# Patient Record
Sex: FEMALE | Race: WHITE
[De-identification: names, ages, dates, MRNs, and addresses within clinical notes are randomized per-mention and may not be internally consistent; named-entity substitution may affect disease eponyms.]

---

## 2017-05-17 NOTE — RADRPT
PROCEDURE:   XR Chest 1 View. 

 

CLINICAL INDICATION: Shortness of breath.

 

TECHNIQUE:   AP view of the chest was obtained. 

 

COMPARISON:   None. 

 

FINDINGS:

The cardiomediastinal silhouette is within normal limits. Elevation of the right hemidiaphragm is id
entified.  Diffuse mild interstitial prominence is seen in both lungs.  Atelectasis is noted at the 
lung bases. No consolidations are identified.  No pneumothorax is seen.  Osseous structures are inta
ct. 

 

IMPRESSION:

Mild elevation of the right hemidiaphragm.

 

Hypoinflated lungs with atelectasis at the lung bases.

 

Diffuse mild interstitial prominence in both lungs.  Interstitial prominence could be chronic. 

 

 

RPTAT: AA

_____________________________________________ 

.Florentino Watson MD, MD           Date    Time 

Electronically viewed and signed by .Florentino Watson MD, MD on 05/17/2017 18:00 

 

D:  05/17/2017 18:00  T:  05/17/2017 18:00

.P/

## 2017-05-17 NOTE — RADRPT
PROCEDURE:   CT Abdomen and Pelvis without contrast 

 

CLINICAL INDICATION:  Right flank pain

 

TECHNIQUE:   Transaxial images were obtained through the abdomen and pelvis on a multi-slice scanner
 without the intravenous contrast administration.  No oral contrast had previously been given. Sagit
kristen and coronal re-formations were subsequently reconstructed.

 

One or more of the following dose reduction techniques were used:

- Automated exposure control.

- Adjustment of the mA and/or kV according to patient size.

- Use of iterative reconstruction technique.

 

Radiation dose: CTDIvol = 7.11 mGy; DLP = 401.36 mGy-cm.

 

COMPARISON:   No prior studies are available for comparison. 

 

FINDINGS:

 

Lung bases: Discoid atelectatic changes seen at both lung bases.

 

Liver: The liver is borderline enlarged but no focal lesion is identified.

 

Gallbladder: The gallbladder is distended.  A small stone is seen a gravity 8 within the gallbladder
.  The gallbladder wall appears mildly thickened.

 

Bile ducts: The common bile duct is dilated to 1.1 cm through the head of the pancreas.  No choledoc
holith is identified 

 

Pancreas: Appears normal with no mass or inflammation evident.

 

Spleen: Normal in size with no focal lesion.

 

Adrenals: Normal with no mass identified.

 

Kidneys, ureters and bladder: The right kidney is quite small without hydronephrosis or intra renal 
calcification.  Two 2 mm nonobstructing nephroliths are seen in the superior pole right kidney and t
he kidney is lobulated in contour compatible with cortical scarring but there is no hydronephrosis. 
The ureters are normal in caliber and no ureteroliths are identified. The bladder is suboptimally di
stended giving the wall a mildly thickened appearance.

 

Reproductive organs: The uterus is absent.  No adnexal mass is evident.

 

Stomach and bowel: There is a small hiatal hernia.  Surgical clips are seen in the region of the sto
mach.  There is no evidence of small bowel obstruction.  Substantial stool is seen within the colon.
  There is dilatation of the cecum and ascending colon as well as the proximal transverse colon with
 thickening of the bowel wall along with intramural air identified.

 

Appendix: The vermiform appendix is not identified.

 

Peritoneum: There is considerable stranding within the omentum in the region of the hepatic flexure 
of the colon and gallbladder.  No free intraperitoneal fluid or air is identified.

 

Aorta: There is atherosclerotic vascular calcification but no abdominal aortic aneurysm is evident.

 

IVC: Unremarkable.

 

Lymph nodes: No pathologically enlarged nodes are identified.

 

Osseous structures: The osseous elements appear intact.  A 5 mm central posterior disk protrusion is
 seen at L5-S1.

 

IMPRESSION: 

1.  There is stool distension of the cecum, ascending and proximal transverse colon with bowel wall 
thickening at the hepatic flexure and proximal transverse colon with intramural air.  Differential p
ossibilities include ischemic bowel as well as a benign etiologies.  No portal air is identified. Cl
inical correlation is indicated.  There is no evidence of bowel obstruction. There is a hiatal herni
a and evidence of previous gastric surgery.

2. The gallbladder is distended, there is cholelithiasis in the gallbladder wall appears thickened r
aising the possibility of cholecystitis.

3.  The common bile duct is dilated to 1.1 cm through the head of the pancreas.  No choledocholith i
s evident and no pancreatic pathology is noted.

4.  Extensive stranding seen to the omentum in the region of the hepatic flexure and gallbladder com
patible with edema and inflammatory change.

5.  There is no free intraperitoneal fluid or air.

6.  Atrophic right kidney without hydronephrosis.  The left kidney is normal in size with cortical s
carring and with two 2 mm nonobstructing nephroliths within the superior pole.  There is no hydronep
hrosis.

7.  Borderline hepatomegaly

8.  5 mm central posterior disk protrusion at L5-S1.

 

Findings of right colonic bowel wall thickening with intramural air for which ischemia cannot be exc
luded along with possible cholecystitis were telephoned by Delbert Concepcion MD to Dr. Spence on 05/1
7/2017 at 1550 hours.

_____________________________________________ 

Physician Adrian           Date    Time 

Electronically viewed and signed by Physician Adrian on 05/17/2017 16:01 

 

D:  05/17/2017 16:01  T:  05/17/2017 16:01

/

## 2017-05-17 NOTE — HP
Date/Time of Note


Date/Time of Note


DATE: 5/17/17 


TIME: 18:21





Assessment/Plan


VTE Prophylaxis


VTE Prophylaxis Intervention:  SCD's





Assessment/Plan


Assessment/Plan


62-year-old female who presents to the ER with right-sided abdominal pain, 

managed as follows


1.  Cholelithiasis with choledocholithiasis


2.  Acute renal failure rule out chronic kidney disease


3.  Leukocytosis secondary to #1 rule out acute cholecystitis


4.  Tobacco user





Plan:





1   Admit to [facility], MRCP is not indicated at this time as we have CT 

confirmation of choledocholithiasis, I believe patient should proceed with 

ERCP.  We will get GI consultation


2.  General surgical consultation has been obtained by ER


3.  Empiric antibiotics for cholecystitis


4.  Smoking Cessation Therapy:  Pt. was lectured for greater than  3 minutes on 

the health risks of continued smoking and the benefits of cessation and this 

will continue to be reinforced throughout hospitalization.


5.  Supportive care and pain control.





Plan of care has been discussed with patient questions answered.  Further 

interventions per clinical course.





Prophylaxis: SCDs and PPI





HPI/ROS


Admit Date/Time


Admit Date/Time


 May 17, 2017





Hx of Present Illness


PRESENTING COMPLAINT: abd pain


HISTORY OF PRESENTING COMPLAINT: This is a pleasant 62-year-old female who 

presents to the ER today with abdominal pain.  Pain is located in the 

epigastric region that has been going on for the last 2 days.  Pain is 

associated with nausea and vomiting, and described as an aching and sharp pain.

  Patient does have a history of gallstones and she might have a history of 

renal problems.  Pain was relieved by medications given in the emergency room.  

Patient was worked up extensively by ER doctor, and findings are highly 

suggestive of cholelithiasis with choledocholithiasis.  She is being admitted 

for further workup and management as well as gastroenterology as well as 

surgical review.





ROS


12 point review of system was done and pertinent findings as noted





ROS: 


CONSTITUTIONAL: denies fever, chills, weight loss, weight gain


HEENT:  denies headaches, any vertigo, any sore throat or rhinorrhea. 


Eyes:   No double or blurred vision or eye pain. 


CARDIOVASCULAR:  no chest discomfort, chest pain, irregular rhythm, tachycardia 

or diaphoresis. 


RESPIRATORY: denies cough or shortness of breath or wheezing. 


GENITOURINARY:  denies dysuria, frequency, urgency or hematuria. 


MUSCULOSKELETAL:   also denies myalgias, arthralgias or edema. 


SKIN:  denies rash or jaundice


NEUROLOGIC:  denies weakness, dizziness, focal neurological change or headache.


 PSYCHIATRIC: denies  history of depression in the past,  any suicidal 

ideation. substance abuse. 


ENDOCRINE:  denies polyuria, polydipsia or hot or cold intolerance. 


HEMATOLOGIC:  denies history of easy bruising, anemia or eczema.





PMH/Family/Social


Past Medical History


1.  Gallstones


2.  Chronic kidney disease likely secondary to kidney stones





Past Surgical History


1.  Appendectomy


2.  Hysterectomy





Social History


Alcohol Use:  none


Smoking Status:  Current every day smoker


Drug Use:  none





Exam/Review of Systems


Vital Signs


Vitals





 VS - Last 72 Hours, by Label








  Date Time  Temp Pulse Resp B/P Pulse Ox O2 Delivery O2 Flow Rate FiO2


 


5/17/17 13:02 97.5 86 20 112/56 100   








 Vital Signs








  Date Time  Temp Pulse Resp B/P Pulse Ox O2 Delivery O2 Flow Rate FiO2


 


5/17/17 13:02 97.5 86 20 112/56 100   











Exam


Constitutional:  alert, oriented, 


   No distress


Psych:  anxiety


Head:  normocephalic


Eyes:  nl conjunctiva, 


   No icteric


ENMT:  mucosa pink and moist


Neck:  non-tender, supple


Respiratory:  clear to auscultation, normal air movement


Cardiovascular:  regular rate and rhythm, 


   No murmurs/extra sounds


Gastrointestinal:  bowel sounds, soft, tender (RUQ and epigastrium)


Neurological:  lethargic, nl mental status


Skin:  No rash or lesions





Labs


Result Diagram:  


5/17/17 1327                                                                   

             5/17/17 1327








Medications


Medications





 Current Medications


Metronidazole 100 ml @  100 mls/hr ONCE  ONCE IVPB ;  Start 5/17/17 at 18:30;  

Stop 5/17/17 at 19:29


Sodium Chloride (NS) 1,000 ml @  80 mls/hr [id number] IV ;  Start 5/17/17 at 18:04

;  Stop 5/18/17 at 06:33





Procedures


Procedures





 Laboratory Tests








Test


  5/17/17


13:27 5/17/17


15:00


 


White Blood Count 20.910^3/ul  


 


Red Blood Count 3.7610^6/ul  


 


Hemoglobin 10.7g/dl  


 


Hematocrit 33.8%  


 


Mean Corpuscular Volume 89.9fl  


 


Mean Corpuscular Hemoglobin 28.5pg  


 


Mean Corpuscular Hemoglobin


Concent 31.7g/dl 


  


 


 


Red Cell Distribution Width 14.5%  


 


Platelet Count 89020^3/UL  


 


Mean Platelet Volume 10.8fl  


 


Neutrophils % 84.7%  


 


Lymphocytes % 5.0%  


 


Monocytes % 3.1%  


 


Eosinophils % 0.0%  


 


Basophils % 0.2%  


 


Nucleated Red Blood Cells % 0.0/100WBC  


 


Neutrophils # 17.610^3/ul  


 


Lymphocytes # 1.110^3/ul  


 


Monocytes # 0.710^3/ul  


 


Eosinophils # 0.010^3/ul  


 


Basophils # 0.010^3/ul  


 


Nucleated Red Blood Cells # 0.010^3/ul  


 


Sodium Level 133mmol/L  


 


Potassium Level 5.1mmol/L  


 


Chloride Level 104mmol/L  


 


Carbon Dioxide Level 16mmol/L  


 


Anion Gap 18  


 


Blood Urea Nitrogen 63mg/dl  


 


Creatinine 2.73mg/dl  


 


Glucose Level 91mg/dl  


 


Calcium Level 8.7mg/dl  


 


Total Bilirubin 0.0mg/dl  


 


Direct Bilirubin 0.00mg/dl  


 


Indirect Bilirubin 0.0mg/dl  


 


Aspartate Amino Transf


(AST/SGOT) 42IU/L 


  


 


 


Alanine Aminotransferase


(ALT/SGPT) 45IU/L 


  


 


 


Alkaline Phosphatase 215IU/L  


 


Total Protein 7.4g/dl  


 


Albumin 3.8g/dl  


 


Globulin 3.60g/dl  


 


Albumin/Globulin Ratio 1.05  


 


Lipase 78U/L  


 


Urine Color  YELLOW 


 


Urine Clarity  CLEAR 


 


Urine pH  5.5 


 


Urine Specific Gravity  1.020 


 


Urine Ketones  NEGATIVE 


 


Urine Nitrite  NEGATIVE 


 


Urine Bilirubin  NEGATIVE 


 


Urine Urobilinogen  0.2  E.U./dL 


 


Urine Leukocyte Esterase  NEGATIVE 


 


Urine Microscopic RBC  NONE SEEN/HPF 


 


Urine Microscopic WBC  0-2/HPF 


 


Urine Amorphous Urates  MODERATE 


 


Urine Hemoglobin  NEGATIVE 


 


Urine Glucose  NEGATIVE% 


 


Urine Total Protein  1+ 








 Current Medications








 Medications


  (Trade)  Dose


 Ordered  Sig/Hallie


 Route


 PRN Reason  Start Time


 Stop Time Status Last Admin


Dose Admin


 


 Sodium Chloride


  (NS)  1,000 ml @ 


 1,000 mls/hr  Q1H STAT


 IV


   5/17/17 13:23


 5/17/17 14:22 DC 5/17/17 13:31


1,000 MLS/HR


 


 Ondansetron HCl


  (Zofran Inj)  4 mg  ONCE  STAT


 IV


   5/17/17 13:23


 5/17/17 13:24 DC 5/17/17 13:31


4 MG


 


 Famotidine


  (Pepcid Iv)  20 mg  ONCE  STAT


 IV


   5/17/17 13:23


 5/17/17 13:24 DC 5/17/17 13:31


20 MG


 


 Ketorolac


 Tromethamine 30 mg  30 mg  ONCE  STAT


 IV


   5/17/17 13:23


 5/17/17 13:24 DC 5/17/17 13:31


30 MG


 


 Ceftriaxone Sodium


  (Rocephin)  50 ml @ 


 100 mls/hr  ONCE  ONCE


 IVPB


   5/17/17 16:00


 5/17/17 16:29 DC 5/17/17 15:45


100 MLS/HR


 


 Morphine Sulfate


  (morphine)  6 mg  ONCE  ONCE


 IV


   5/17/17 16:30


 5/17/17 16:31 DC 5/17/17 16:08


6 MG


 


 IV Flush 10 ml  10 ml  STK-MED ONCE


 .ROUTE


   5/17/17 16:06


 5/17/17 16:07 DC 5/17/17 16:32


10 ML


 


 Sodium Chloride


  (NS)  100 ml @ ud  STK-MED ONCE


 .ROUTE


   5/17/17 16:06


 5/17/17 16:07 DC 5/17/17 16:33


50 ML/14 S


 


 Iodixanol


  (Visipaque Locm)  100 ml  STK-MED ONCE


 .ROUTE


   5/17/17 16:06


 5/17/17 16:07 DC 5/17/17 16:34


100 ML


 


 Iodixanol


  (Visipaque Locm)  50 ml  STK-MED ONCE


 .ROUTE


   5/17/17 16:06


 5/17/17 16:07 DC  


 


 


 Sodium Chloride


 1760 ml  1,760 ml  BOLUS OVER 2 HOURS STAT


 IV*


   5/17/17 17:08


 5/17/17 17:10 DC  


 


 


 Metronidazole  100 ml @ 


 100 mls/hr  ONCE  ONCE


 IVPB


   5/17/17 18:30


 5/17/17 19:29   


 


 


 Sodium Chloride


  (NS)  1,000 ml @ 


 80 mls/hr  [id number]


 IV


   5/17/17 18:04


 5/18/17 06:33   


 


 


 Ondansetron HCl


  (Zofran Inj)  4 mg  BRIDGE ORDER PRN


 IV


 NAUSEA AND/OR VOMITING  5/17/17 18:30


 5/18/17 18:29   


 


 


 Acetaminophen


  (Tylenol Tab)  650 mg  ER BRIDGE PRN


 PO


 MILD PAIN/FEVER  5/17/17 18:30


 5/18/17 18:29   


 








PROCEDURE:   US Abdomen (right upper quadrant). 


 


CLINICAL INDICATION:   Right upper quadrant abdomen pain.  


 


TECHNIQUE:   Multiple real-time longitudinal and transverse images of the right 

upper quadrant of the abdomen were acquired utilizing a curved array 

transducer. Images were reviewed on a high-resolution PACS workstation. 


 


COMPARISON:   CT scan of the abdomen and pelvis done earlier the same day. 


 


FINDINGS:


The liver is normal in size and normal in echogenicity.


There is no focal hepatic lesion.   Color Doppler and pulsed Doppler sonography 

demonstrate normal antegrade flow in the portal vein. 


The gallbladder is distended and there is mild thickening of the wall measuring 

4 mm.  There is no surrounding fluid.  No gallstones are visualized.


The bile ducts are dilated with the common bile duct measuring 14.5 mm in 

diameter.  


The pancreas is not visualized due to overlying bowel gas.  


No free fluid is present.  


The right kidney is not visualized.


 


IMPRESSION:


1.  Distended gallbladder with mild wall thickening.  No gallstones.  This may 

indicate acalculous cholecystitis.  Clinical correlation is advised.


2.  Pancreas not visualized.


3.  Dilated common bile duct measuring 14.5 mm.  Correlation with MRCP should 

be considered.


4.  Right kidney not visualized. 


 


RPTAT: QQ


_____________________________________________ 


.Alvarado Fajardo MD, MD           Date    Time 


Electronically viewed and signed by .Alvarado Fajardo MD, MD on 05/17/2017 17:49 


 


D:  05/17/2017 17:49  T:  05/17/2017 17:49


.R/





CC: KELLIE IBRAHIM DO


PROCEDURE:   CT angiogram of the abdomen and pelvis with bilateral lower 

extremity runoff and with 3-D reconstructions


 


CLINICAL INDICATION:   r/o ischemic colitis


 


TECHNIQUE:   CT angiogram of the  abdomen and pelvis was performed on a 

multislice CT scanner .  The patient was scanned after administration of 

intravenous contrast.  Sagittal and coronal reformatted images were obtained 

from the axial source images. 3D MIP reformatted images were also created from 

the axial source images.


 


DLP            785.95    mGycm 


CTDI vol     7.62, 7.66    mGy


 


COMPARISON:   CT abdomen/pelvis from 05/17/2017 at 15:22 hours


 


FINDINGS:


 


ANGIOGRAM FINDINGS:


 


There is no acute dissection or aneurysm of the abdominal aorta.


The celiac, SMA, and CORNELIUS are widely patent. 


The right renal artery is widely patent.  There is moderate to severe narrowing 

of the proximal left renal artery.


 


Common, internal and external iliac arteries are patent bilaterally. 


 


 


RIGHT LOWER EXTREMITY:


The CFA, SFA, and profunda arteries are widely patent. 


The popliteal artery is widely patent. 


Infrapopliteal vessels are widely patent and there is good three-vessel runoff 

to the level of the ankle. 


 


 


LEFT LOWER EXTREMITY:


The CFA, SFA, and profunda arteries are widely patent. 


The popliteal artery is widely patent. 


Infrapopliteal vessels are widely patent and there is good three-vessel runoff 

to the level of the ankle. 


 


 


ANCILLARY FINDINGS:


 


There is a small hiatal hernia.  Surgical changes are noted at the 

gastroesophageal junction.


The gallbladder is again noted to be distended and demonstrate wall thickening. 

There is prominent mesenteric stranding just below the inferior extent of the 

gallbladder, adjacent to the proximal transverse colon  The common bile duct is 

dilated to 14 mm and terminates abruptly at the pancreatic head.  There is mild 

intrahepatic biliary ductal dilatation.


There are colonic diverticula without evidence of diverticulitis.


 


 


IMPRESSION:


The celiac, SMA, and CORNELIUS are widely patent. No evidence of mesenteric ischemia.


 


Markedly dilated common bile duct measuring up to 14 mm in diameter which 

abruptly terminates at the pancreatic head.  There is also mild intrahepatic 

biliary ductal dilatation as well as marked distension of the gallbladder.  

Findings suggest an obstructing lesion at the distal common bile duct which may 

be a choledocholith, pancreatic lesion, or an ampullary lesion.  An MRCP is 

recommended for further evaluation.


 


Small hiatal hernia and postsurgical changes at the gastroesophageal junction.


 


Colonic diverticula without evidence of diverticulitis.


 


 


RPTAT: EE


_____________________________________________ 


Physician Roger           Date    Time 


Electronically viewed and signed by Physician Roger on 05/17/2017 17:13 


 


D:  05/17/2017 17:13  T:  05/17/2017 17:13


RA/





PROCEDURE:   CT Abdomen and Pelvis without contrast 


 


CLINICAL INDICATION:  Right flank pain


 


TECHNIQUE:   Transaxial images were obtained through the abdomen and pelvis on 

a multi-slice scanner without the intravenous contrast administration.  No oral 

contrast had previously been given. Sagittal and coronal re-formations were 

subsequently reconstructed.


 


One or more of the following dose reduction techniques were used:


- Automated exposure control.


- Adjustment of the mA and/or kV according to patient size.


- Use of iterative reconstruction technique.


 


Radiation dose: CTDIvol = 7.11 mGy; DLP = 401.36 mGy-cm.


 


COMPARISON:   No prior studies are available for comparison. 


 


FINDINGS:


 


Lung bases: Discoid atelectatic changes seen at both lung bases.


 


Liver: The liver is borderline enlarged but no focal lesion is identified.


 


Gallbladder: The gallbladder is distended.  A small stone is seen a gravity 8 

within the gallbladder.  The gallbladder wall appears mildly thickened.


 


Bile ducts: The common bile duct is dilated to 1.1 cm through the head of the 

pancreas.  No choledocholith is identified 


 


Pancreas: Appears normal with no mass or inflammation evident.


 


Spleen: Normal in size with no focal lesion.


 


Adrenals: Normal with no mass identified.


 


Kidneys, ureters and bladder: The right kidney is quite small without 

hydronephrosis or intra renal calcification.  Two 2 mm nonobstructing 

nephroliths are seen in the superior pole right kidney and the kidney is 

lobulated in contour compatible with cortical scarring but there is no 

hydronephrosis. The ureters are normal in caliber and no ureteroliths are 

identified. The bladder is suboptimally distended giving the wall a mildly 

thickened appearance.


 


Reproductive organs: The uterus is absent.  No adnexal mass is evident.


 


Stomach and bowel: There is a small hiatal hernia.  Surgical clips are seen in 

the region of the stomach.  There is no evidence of small bowel obstruction.  

Substantial stool is seen within the colon.  There is dilatation of the cecum 

and ascending colon as well as the proximal transverse colon with thickening of 

the bowel wall along with intramural air identified.


 


Appendix: The vermiform appendix is not identified.


 


Peritoneum: There is considerable stranding within the omentum in the region of 

the hepatic flexure of the colon and gallbladder.  No free intraperitoneal 

fluid or air is identified.


 


Aorta: There is atherosclerotic vascular calcification but no abdominal aortic 

aneurysm is evident.


 


IVC: Unremarkable.


 


Lymph nodes: No pathologically enlarged nodes are identified.


 


Osseous structures: The osseous elements appear intact.  A 5 mm central 

posterior disk protrusion is seen at L5-S1.


 


IMPRESSION: 


1.  There is stool distension of the cecum, ascending and proximal transverse 

colon with bowel wall thickening at the hepatic flexure and proximal transverse 

colon with intramural air.  Differential possibilities include ischemic bowel 

as well as a benign etiologies.  No portal air is identified. Clinical 

correlation is indicated.  There is no evidence of bowel obstruction. There is 

a hiatal hernia and evidence of previous gastric surgery.


2. The gallbladder is distended, there is cholelithiasis in the gallbladder 

wall appears thickened raising the possibility of cholecystitis.


3.  The common bile duct is dilated to 1.1 cm through the head of the pancreas.

  No choledocholith is evident and no pancreatic pathology is noted.


4.  Extensive stranding seen to the omentum in the region of the hepatic 

flexure and gallbladder compatible with edema and inflammatory change.


5.  There is no free intraperitoneal fluid or air.


6.  Atrophic right kidney without hydronephrosis.  The left kidney is normal in 

size with cortical scarring and with two 2 mm nonobstructing nephroliths within 

the superior pole.  There is no hydronephrosis.


7.  Borderline hepatomegaly


8.  5 mm central posterior disk protrusion at L5-S1.


 


Findings of right colonic bowel wall thickening with intramural air for which 

ischemia cannot be excluded along with possible cholecystitis were telephoned 

by Delbert Concepcion MD to Dr. Ibrahim on 05/17/2017 at 1550 hours.


_____________________________________________ 


Physician Adrian           Date    Time 


Electronically viewed and signed by Physician Adrian on 05/17/2017 16:01 


 


D:  05/17/2017 16:01  T:  05/17/2017 16:01


RH/





CC: KELLIE IBRAHIM BOLATITO M. May 17, 2017 18:32

## 2017-05-17 NOTE — RADRPT
PROCEDURE:   CT angiogram of the abdomen and pelvis with bilateral lower extremity runoff and with 3
-D reconstructions

 

CLINICAL INDICATION:   r/o ischemic colitis

 

TECHNIQUE:   CT angiogram of the  abdomen and pelvis was performed on a multislice CT scanner .  The
 patient was scanned after administration of intravenous contrast.  Sagittal and coronal reformatted
 images were obtained from the axial source images. 3D MIP reformatted images were also created from
 the axial source images.

 

DLP            785.95    mGycm 

CTDI vol     7.62, 7.66    mGy

 

COMPARISON:   CT abdomen/pelvis from 05/17/2017 at 15:22 hours

 

FINDINGS:

 

ANGIOGRAM FINDINGS:

 

There is no acute dissection or aneurysm of the abdominal aorta.

The celiac, SMA, and CORNELIUS are widely patent. 

The right renal artery is widely patent.  There is moderate to severe narrowing of the proximal left
 renal artery.

 

Common, internal and external iliac arteries are patent bilaterally. 

 

 

RIGHT LOWER EXTREMITY:

The CFA, SFA, and profunda arteries are widely patent. 

The popliteal artery is widely patent. 

Infrapopliteal vessels are widely patent and there is good three-vessel runoff to the level of the a
nkle. 

 

 

LEFT LOWER EXTREMITY:

The CFA, SFA, and profunda arteries are widely patent. 

The popliteal artery is widely patent. 

Infrapopliteal vessels are widely patent and there is good three-vessel runoff to the level of the a
nkle. 

 

 

ANCILLARY FINDINGS:

 

There is a small hiatal hernia.  Surgical changes are noted at the gastroesophageal junction.

The gallbladder is again noted to be distended and demonstrate wall thickening. There is prominent m
esenteric stranding just below the inferior extent of the gallbladder, adjacent to the proximal mtz
sverse colon  The common bile duct is dilated to 14 mm and terminates abruptly at the pancreatic hea
d.  There is mild intrahepatic biliary ductal dilatation.

There are colonic diverticula without evidence of diverticulitis.

 

 

IMPRESSION:

The celiac, SMA, and CORNELIUS are widely patent. No evidence of mesenteric ischemia.

 

Markedly dilated common bile duct measuring up to 14 mm in diameter which abruptly terminates at the
 pancreatic head.  There is also mild intrahepatic biliary ductal dilatation as well as marked diste
nsion of the gallbladder.  Findings suggest an obstructing lesion at the distal common bile duct whi
ch may be a choledocholith, pancreatic lesion, or an ampullary lesion.  An MRCP is recommended for f
urther evaluation.

 

Small hiatal hernia and postsurgical changes at the gastroesophageal junction.

 

Colonic diverticula without evidence of diverticulitis.

 

 

RPTAT: EE

_____________________________________________ 

Olman Colindres Physician           Date    Time 

Electronically viewed and signed by Olman Colindres Physician on 05/17/2017 17:13 

 

D:  05/17/2017 17:13  T:  05/17/2017 17:13

YELENA/

## 2017-05-17 NOTE — RADRPT
PROCEDURE:   US Abdomen (right upper quadrant). 

 

CLINICAL INDICATION:   Right upper quadrant abdomen pain.  

 

TECHNIQUE:   Multiple real-time longitudinal and transverse images of the right upper quadrant of th
e abdomen were acquired utilizing a curved array transducer. Images were reviewed on a high-resoluti
on PACS workstation. 

 

COMPARISON:   CT scan of the abdomen and pelvis done earlier the same day. 

 

FINDINGS:

The liver is normal in size and normal in echogenicity.

There is no focal hepatic lesion.   Color Doppler and pulsed Doppler sonography demonstrate normal a
ntegrade flow in the portal vein. 

The gallbladder is distended and there is mild thickening of the wall measuring 4 mm.  There is no s
urrounding fluid.  No gallstones are visualized.

The bile ducts are dilated with the common bile duct measuring 14.5 mm in diameter.  

The pancreas is not visualized due to overlying bowel gas.  

No free fluid is present.  

The right kidney is not visualized.

 

IMPRESSION:

1.  Distended gallbladder with mild wall thickening.  No gallstones.  This may indicate acalculous c
holecystitis.  Clinical correlation is advised.

2.  Pancreas not visualized.

3.  Dilated common bile duct measuring 14.5 mm.  Correlation with MRCP should be considered.

4.  Right kidney not visualized. 

 

RPTAT: QQ

_____________________________________________ 

.Alvarado Fajardo MD, MD           Date    Time 

Electronically viewed and signed by .Alvarado Fajardo MD, MD on 05/17/2017 17:49 

 

D:  05/17/2017 17:49  T:  05/17/2017 17:49

.R/

## 2017-05-17 NOTE — ERA
ER Documentation


Chief Complaint


Date/Time


DATE: 5/17/17 


TIME: 18:08


Chief Complaint


RUQ PAIN WITH RADIATING PAIN TO THE MID-ABD AREA, +SOB&N&V





HPI


This is a 62-year-old female who presents to the emergency room for evaluation 

of abdominal pain.  The patient states that she is on abdominal pain for the 

past 2 days this is progressively gotten worse today.  She states that she has 

been feeling nauseous and has had 2 episodes of vomiting.  Patient localized 

the pain to the right flank and states it is an aching sharp pain with 

radiation into her groin.  The patient does state that she has a history of 

gallstones, and states that she came to the emergency room today for evaluation 

of her symptoms.  She denies any aggravating or relieving factors for her pain 

and denies any vomiting blood, or blood in the stool





ROS


All systems reviewed and are negative except as per history of present illness.





Medications


Home Meds


Reported Medications


Promethazine Hcl* (Phenergan*) 25 Mg Tablet, 25 MG PO Q6H Y for NAUSEA AND/OR 

VOMITING, TAB


   5/17/17


Omeprazole* (Omeprazole*) 20 Mg Capsule.dr, 20 MG PO DAILY, #30 CAP


   5/17/17


Simvastatin* (Zocor*) 40 Mg Tablet, 40 MG PO QHS, #30 TAB


   5/17/17





Allergies


Allergies:  


Coded Allergies:  


     Penicillins (Verified  Allergy, Unknown, 5/17/17)


     diphenhydramine (Verified  Allergy, Unknown, 5/17/17)


     tamsulosin (Verified  Allergy, Unknown, 5/17/17)





PMhx/Soc


History of Surgery:  Yes (BACK, NECK, APPY)


Hx Miscellaneous Medical Probl:  Yes (KIDNEY, GALLBLADDER)


Hx Alcohol Use:  No


Hx Substance Use:  No


Hx Tobacco Use:  Yes


Smoking Status:  Current every day smoker





Physical Exam


Vitals





Vital Signs








  Date Time  Temp Pulse Resp B/P Pulse Ox O2 Delivery O2 Flow Rate FiO2


 


5/17/17 13:02 97.5 86 20 112/56 100   








Physical Exam


INITIAL VITAL SIGNS: Reviewed by me


GENERAL:  The patient is frail-appearing elderly female, mild distress


HEENT: Dry mucous membranes, pupils equal, round, and reactive to light.  EOMI. 

There is no scleral icterus.


NECK:  C-spine is soft and supple, there is no meningismus.  There is no 

cervical lymphadenopathy.


LUNGS:  Clear to auscultation bilaterally. There are no rales, wheezes or 

rhonchi.


HEART:  Regular rate and rhythm, no murmurs, clicks, rubs or gallops.


ABDOMEN: Positive Shook sign there are bowel sounds in all four quadrants. No 

rebound or guarding.


EXTREMITIES:  There is no peripheral cyanosis or edema.  No focal swelling or 

erythema.


NEUROLOGICAL:  The patient moves all four extremities with 5/5 strength.  

Cranial nerves II - XII are intact. Normal gait. Alert and oriented


SKIN:  There is no apparent rash or petechiae.


HEME/LYMPHATIC:  There is no evidence of excessive bruising or lymphedema.


PSYCHIATRIC:  The patient does not appear anxious or depressed.


Result Diagram:  


5/17/17 1327                                                                   

             5/17/17 1327





Results 24 hrs





 Laboratory Tests








Test


  5/17/17


13:27 5/17/17


15:00


 


White Blood Count 20.910^3/ul  


 


Red Blood Count 3.7610^6/ul  


 


Hemoglobin 10.7g/dl  


 


Hematocrit 33.8%  


 


Mean Corpuscular Volume 89.9fl  


 


Mean Corpuscular Hemoglobin 28.5pg  


 


Mean Corpuscular Hemoglobin


Concent 31.7g/dl 


  


 


 


Red Cell Distribution Width 14.5%  


 


Platelet Count 88203^3/UL  


 


Mean Platelet Volume 10.8fl  


 


Neutrophils % 84.7%  


 


Lymphocytes % 5.0%  


 


Monocytes % 3.1%  


 


Eosinophils % 0.0%  


 


Basophils % 0.2%  


 


Nucleated Red Blood Cells % 0.0/100WBC  


 


Neutrophils # 17.610^3/ul  


 


Lymphocytes # 1.110^3/ul  


 


Monocytes # 0.710^3/ul  


 


Eosinophils # 0.010^3/ul  


 


Basophils # 0.010^3/ul  


 


Nucleated Red Blood Cells # 0.010^3/ul  


 


Sodium Level 133mmol/L  


 


Potassium Level 5.1mmol/L  


 


Chloride Level 104mmol/L  


 


Carbon Dioxide Level 16mmol/L  


 


Anion Gap 18  


 


Blood Urea Nitrogen 63mg/dl  


 


Creatinine 2.73mg/dl  


 


Glucose Level 91mg/dl  


 


Calcium Level 8.7mg/dl  


 


Total Bilirubin 0.0mg/dl  


 


Direct Bilirubin 0.00mg/dl  


 


Indirect Bilirubin 0.0mg/dl  


 


Aspartate Amino Transf


(AST/SGOT) 42IU/L 


  


 


 


Alanine Aminotransferase


(ALT/SGPT) 45IU/L 


  


 


 


Alkaline Phosphatase 215IU/L  


 


Total Protein 7.4g/dl  


 


Albumin 3.8g/dl  


 


Globulin 3.60g/dl  


 


Albumin/Globulin Ratio 1.05  


 


Lipase 78U/L  


 


Urine Color  YELLOW 


 


Urine Clarity  CLEAR 


 


Urine pH  5.5 


 


Urine Specific Gravity  1.020 


 


Urine Ketones  NEGATIVE 


 


Urine Nitrite  NEGATIVE 


 


Urine Bilirubin  NEGATIVE 


 


Urine Urobilinogen  0.2  E.U./dL 


 


Urine Leukocyte Esterase  NEGATIVE 


 


Urine Microscopic RBC  NONE SEEN/HPF 


 


Urine Microscopic WBC  0-2/HPF 


 


Urine Amorphous Urates  MODERATE 


 


Urine Hemoglobin  NEGATIVE 


 


Urine Glucose  NEGATIVE% 


 


Urine Total Protein  1+ 








 Current Medications








 Medications


  (Trade)  Dose


 Ordered  Sig/Hallie


 Route


 PRN Reason  Start Time


 Stop Time Status Last Admin


Dose Admin


 


 Sodium Chloride


  (NS)  1,000 ml @ 


 1,000 mls/hr  Q1H STAT


 IV


   5/17/17 13:23


 5/17/17 14:22 DC 5/17/17 13:31


 


 


 Ondansetron HCl


  (Zofran Inj)  4 mg  ONCE  STAT


 IV


   5/17/17 13:23


 5/17/17 13:24 DC 5/17/17 13:31


 


 


 Famotidine


  (Pepcid Iv)  20 mg  ONCE  STAT


 IV


   5/17/17 13:23


 5/17/17 13:24 DC 5/17/17 13:31


 


 


 Ketorolac


 Tromethamine 30 mg  30 mg  ONCE  STAT


 IV


   5/17/17 13:23


 5/17/17 13:24 DC 5/17/17 13:31


 


 


 Ceftriaxone Sodium


  (Rocephin)  50 ml @ 


 100 mls/hr  ONCE  ONCE


 IVPB


   5/17/17 16:00


 5/17/17 16:29 DC 5/17/17 15:45


 


 


 Morphine Sulfate


  (morphine)  6 mg  ONCE  ONCE


 IV


   5/17/17 16:30


 5/17/17 16:31 DC 5/17/17 16:08


 


 


 IV Flush 10 ml  10 ml  STK-MED ONCE


 .ROUTE


   5/17/17 16:06


 5/17/17 16:07 DC 5/17/17 16:32


 


 


 Sodium Chloride


  (NS)  100 ml @ ud  STK-MED ONCE


 .ROUTE


   5/17/17 16:06


 5/17/17 16:07 DC 5/17/17 16:33


 


 


 Iodixanol


  (Visipaque Locm)  100 ml  STK-MED ONCE


 .ROUTE


   5/17/17 16:06


 5/17/17 16:07 DC 5/17/17 16:34


 


 


 Iodixanol


  (Visipaque Locm)  50 ml  STK-MED ONCE


 .ROUTE


   5/17/17 16:06


 5/17/17 16:07 DC  


 


 


 Sodium Chloride


 1760 ml  1,760 ml  BOLUS OVER 2 HOURS STAT


 IV*


   5/17/17 17:08


 5/17/17 17:10 DC  


 


 


 Metronidazole  100 ml @ 


 100 mls/hr  ONCE  ONCE


 IVPB


   5/17/17 18:30


 5/17/17 19:29   


 


 


 Sodium Chloride


  (NS)  1,000 ml @ 


 80 mls/hr  [id number]


 IV


   5/17/17 18:04


 5/18/17 06:33   


 


 


 Ondansetron HCl


  (Zofran Inj)  4 mg  BRIDGE ORDER PRN


 IV


 NAUSEA AND/OR VOMITING  5/17/17 18:30


 5/18/17 18:29   


 


 


 Acetaminophen


  (Tylenol Tab)  650 mg  ER BRIDGE PRN


 PO


 MILD PAIN/FEVER  5/17/17 18:30


 5/18/17 18:29   


 











Procedures/MDM


CT abdomen pelvis without: 1.  There is stool distension of the cecum, 

ascending and proximal transverse colon with bowel wall thickening at the 

hepatic flexure and proximal transverse colon with intramural air.  

Differential possibilities include ischemic bowel as well as a benign 

etiologies.  No portal air is identified. Clinical correlation is indicated.  

There is no evidence of bowel obstruction. There is a hiatal hernia and 

evidence of previous gastric surgery.


2. The gallbladder is distended, there is cholelithiasis in the gallbladder 

wall appears thickened raising the possibility of cholecystitis.


3.  The common bile duct is dilated to 1.1 cm through the head of the pancreas.

  No choledocholith is evident and no pancreatic pathology is noted.


4.  Extensive stranding seen to the omentum in the region of the hepatic 

flexure and gallbladder compatible with edema and inflammatory change.


5.  There is no free intraperitoneal fluid or air.


6.  Atrophic right kidney without hydronephrosis.  The left kidney is normal in 

size with cortical scarring and with two 2 mm nonobstructing nephroliths within 

the superior pole.  There is no hydronephrosis.


7.  Borderline hepatomegaly


8.  5 mm central posterior disk protrusion at L5-S1.


CT angiography:The celiac, SMA, and CORNELIUS are widely patent. No evidence of 

mesenteric ischemia.


 


Markedly dilated common bile duct measuring up to 14 mm in diameter which 

abruptly terminates at the pancreatic head.  There is also mild intrahepatic 

biliary ductal dilatation as well as marked distension of the gallbladder.  

Findings suggest an obstructing lesion at the distal common bile duct which may 

be a choledocholith, pancreatic lesion, or an ampullary lesion.  An MRCP is 

recommended for further evaluation.


 


Small hiatal hernia and postsurgical changes at the gastroesophageal junction.


 


Colonic diverticula without evidence of diverticulitis.


Chest X-ray 1V Interpreted by me:


Soft Tissue:                                               No acute 

abnormalities


Bones:                                                    No acute abnormalities


Mediastinum/Cardiac Silhouette/Lungs:     [No acute abnormalities]


Ultrasound gallbladder: 1.  Distended gallbladder with mild wall thickening.  

No gallstones.  This may indicate acalculous cholecystitis.  Clinical 

correlation is advised.


2.  Pancreas not visualized.


3.  Dilated common bile duct measuring 14.5 mm.  Correlation with MRCP should 

be considered.


4.  Right kidney not visualized. 





This is a 62-year-old female who presents to the emergency room for evaluation 

of abdominal pain.  When I evaluated her this patient did have right-sided CVAT 

and a positive Shook sign.  A CT of the abdomen and pelvis was obtained with 

some concern for possible intramural air, and gallbladder wall thickening.  CT 

Ninfa was obtained to rule out ischemic colitis and CT angiography does not 

reveal any intraluminal air or ischemic colitis.  Ultrasound did confirm 

suspicion of choledocholithiasis with a dilation of the common bile duct.  The 

patient does have a leukocytosis and mild tachycardia and does meet sepsis 

criteria.  She was given 30 cc/kg of IV normal saline.  She was started on 

Rocephin early as we were waiting for blood cultures, and her 3 hour reid was 

approaching.  The patient had blood cultures done and was started on Flagyl as 

well.  I have contacted our general surgeon, Dr. Thomas who agrees to consult 

on this patient.  I contacted her primary admitting physician, Dr. Sin and have 

discussed the case with her and need for possible ERCP.  She verbalized 

understanding and is okay with her plan of care for admission to [facility] at 

this time.  This patient is hemodynamically stable and does not need 

vasopressors.








Critical Care: Excluding all billable procedures


   Time:             34 minutes


   Treatments/Evaluations:   Close monitoring and treatment of unstable vital 

signs, cardiorespiratory, and neurologic status, while maintaining tight 

balance of fluid, respiratory, and cardiac interventions.





Departure


Diagnosis:  


 Primary Impression:  


 Sepsis


 Additional Impressions:  


 Choledocholithiasis with acute cholecystitis


 Renal insufficiency


 Normocytic anemia


Condition:  Fair











KELLIE IBRAHIM DO May 17, 2017 18:14

## 2017-05-18 NOTE — PN
DATE:  05/17/2017

 

 

SUBJECTIVE DATA:  Complains of abdominal pain.  Denies any nausea or vomiting.

 

OBJECTIVE DATA:

VITAL SIGNS:  Temperature 98.5, pulse rate 87, respiratory rate 18, blood 
pressure 100/58, oxygen saturation 95% on low-flow O2.

GENERAL:  This is a 62-year-old female patient lying in bed in no apparent 
distress.

HEENT:  Head normocephalic and atraumatic.  Eyes:  Anicteric sclerae.  
Conjunctivae clear.

ENT:  Nasal septum is midline.  Oral mucosa is dry.

NECK:  Supple.  No JVD noticed.

RESPIRATORY:  Bilateral diminished breath sounds.  No use of accessory muscles 
of respiration.  Bilateral scattered rhonchi.

CARDIAC:  Regular rate and rhythm.  No murmurs heard.

ABDOMEN:  Soft.  Diffuse tenderness especially in the right upper quadrant.  No 
guarding.

GENITOURINARY:  Deferred.

EXTREMITIES:  No cyanosis, no clubbing, no edema.  Peripheral pulses palpable.

NEUROLOGIC:  The patient is awake, alert and oriented.  Cranial nerves are 
grossly intact.

 

LABORATORY AND DIAGNOSTIC DATA:  WBC 12.1, hemoglobin 9.3, hematocrit 29.9, 
platelet count 191.  Sodium 134, potassium 5.1, chloride 105, carbon dioxide 18
, anion gap 16, BUN 60, creatinine 2.57, glucose 90, calcium 7.9, phosphorus 6.1
, magnesium 2.6.

 

ASSESSMENT AND PLAN:

1.  Symptomatic cholelithiasis with possible underlying choledocholithiasis.  
Continue pain control.  Continue empiric antibiotics.  The patient is being 
evaluated by gastroenterology and general surgery.



2.  Leukocytosis.  Most probably secondary to #1.  No evidence of any septic 
shock.  Continue empiric antibiotics.



3.  Nicotine use.  The patient is trying to quit nicotine use.  The patient 
refused a nicotine patch.



4.  Chronic kidney disease.  The patient verbalized that she has known history 
of chronic kidney disease and she was following a nephrologist in [facility].   A nephrologist will be called for evaluation.



5.  Normocytic hypochromic anemia.  Etiology unclear,  most probably secondary 
to underlying chronic kidney disease.  We will order an iron panel on this 
patient.



6.  Fluid, electrolytes and nutrition.  Currently on clear liquid diet.  
Continue on IV fluids.  



7.  Deep venous thrombosis prophylaxis.  Bilateral sequential compression 
devices.



8.  Gastrointestinal prophylaxis.  Histamine 2 receptor blockers.  

 

PLAN:  Continue pain control.  Continue IV hydration.  Await further 
gastroenterology recommendations and surgery recommendations.  Call nephrology 
consult.

 

Case discussed with Dr. Wisdom.

 

 

DAMASO WISDOM MD, AM/JOSE ANGEL

DD:    05/18/2017 10:47:59

DT:    05/18/2017 11:13:22

Conf#: [id number]

DID#:  [id number]

 

MTDD

## 2017-05-18 NOTE — CONS
[facility] INITIAL INPATIENT CONSULTATION NOTE

 

DATE OF CONSULTATION:  2017



PLACE OF SERVICE:  [facility], second floor, [facility].

 

IMPRESSION/PLAN:  A very pleasant but unfortunate 62-year-old lady with 
comorbidities including BMI 26.9 as well as chronic smoker and prior operations
, presenting with a picture consistent with possible early acute cholecystitis 
in the setting of choledocholithiasis and bile duct dilatation.  There is more 
than 90% chance that this is from a benign process such as biliary stone disease
, but certainly an ampullary lesion or a small pancreatic head malignancy or 
distal common bile duct malignancy is not completely ruled out.  She also has 
evidence of significant constipation and fortunately did not have any evidence 
of obvious ischemia on the abdominal angiography, but presence of the stranding 
in the omentum near the hepatic flexure and transverse colon are somewhat 
bothersome.  Her septic parameters have rapidly improved with less than 1 day 
resuscitation in the hospital and that is encouraging.  Renal insufficiency, 
still continues.  She can certainly benefit from more aggressive medical 
management, which is being done here in the hospital.  She should have 
clearance of her common bile duct and medical stabilization prior to a 
laparoscopic cholecystectomy.  It is also possible that if the patient does not 
stabilize enough that she may need a percutaneous transhepatic cholangiography 
drain placed, but this possibility is low.  I explained all this in detail to 
the patient (no family present during any of my discussions with the patient) 
and answered all her questions.  I believe that the patient understood and 
agreed with the plan.

 

With above assessment I have recommended the followin.  Agree with GI consultation with significant thought to performance of an 
ERCP.

2.  Continue aggressive resuscitation and broad-spectrum antimicrobials.

3.  Appreciate renal input and management.

4.  Continued careful observation in house.

5.  If common bile duct is cleared and patient is medically stable to then 
undergo laparoscopic cholecystectomy.  Otherwise, percutaneous drainages is 
another option.

 

Thank you again for allowing us to participate in the care of this very 
pleasant lady, and I am certain her wonderful her wonderful family.  If there 
are any questions, please feel free to contact me at [phone]



UPDATED "SUMMARY":  A very pleasant 62-year-old lady admitted through the 
[facility] Emergency Room on 2017 with evidence for 
possible acute cholecystitis as well as choledocholithiasis.

 

COMORBIDITIES:

1.  BMI 26.9.

2.  Smoking history.

3.  Renal insufficiency.

4.  History of known cholelithiasis.

5.  Status post appendectomy.

6.  Status post hysterectomy.

 

DATE OF ADMISSION: 2017

 

HISTORY OF PRESENT ILLNESS:  The patient is a very pleasant 62-year-old lady 
with above-mentioned comorbidities whom we were kindly asked to consult 
regarding management of her abdominal pain that is associated with a picture 
that is consistent with acute cholecystitis as well as possible 
choledocholithiasis.  Note that images included CT scan of abdomen and pelvis  that showed common bile duct measuring 1.1 cm and gallbladder being 
distended.  Cholelithiasis was found in the gallbladder and the gallbladder 
wall appeared to be thickened.  There was also significant stool distention of 
the cecum, ascending and proximal transverse colon with bowel wall thickening 
at the hepatic flexure and proximal transverse colon with intramural air.  No 
portal air was identified, but differential included ischemic bowel.  No 
evidence of bowel obstruction was noted.  There was also a hiatal hernia and 
evidence of previous gastric surgery.  Extensive stranding was seen to the 
omentum in the region of the hepatic flexure and gallbladder compatible with 
edema and inflammatory change.

 

Atrophic right kidney without hydronephrosis was noted and the left kidney was 
normal in size with a 2 mm nonobstructing nephrolith within the superior pole.  
Borderline hepatomegaly was noted and a 5 mm central posterior disk protrusion 
at L5-S1 level was also noted.  She then underwent abdominal angiography that 
demonstrated patent major vessels of the abdomen without any evidence of 
mesenteric ischemia.

 

Colonic diverticula without evidence of diverticulitis were the only other new 
information from this study.  She also had a right upper quadrant ultrasound on 
the same day that essentially showed similar findings.  Renal ultrasound on  showed small echogenic kidneys consistent with medical renal disease.  
Her laboratory evaluation also was significant for an admission white blood 
cell count of 20.9 that dropped down to 12.1.  Platelet count is 191 and the 
electrolytes have improved since admission with CO2 of 18 up from 16.  
Creatinine is still 2.57, which is slightly better than 2.73 from admission.  
Lactic acid is certainly improved from 3.7 to normal after resuscitation.  The 
alkaline phosphatase remains elevated at 204, but the AST, ALT and total 
bilirubin are normal.  Admission lipase was 78.  Albumin after resuscitation 
was 2.6.  INR is 1.17 and urinalysis was not positive for nitrite or leukocyte 
esterase.  Urine culture is still negative.

 

I had a chance to meet with the patient today and during my visit, the patient 
had abdominal pain and reported nausea and vomiting several times prior to 
coming in.  She has had bowel movements and bowel activity until recently.  No 
other major complaints.

 

ALLERGIES

1.  PENICILLIN.

2.  DIPHENHYDRAMINE.

3.  TAMSULOSIN.

 

HOME MEDICATIONS:  Included:

1.  Omeprazole.

2.  Phenergan. 

3.  Zocor.

 

SOCIAL HISTORY:  The patient's family lives in [place], mainly her sons.  
She reports smoking.  Does not report significant drinking, and no intravenous 
drug use reported.

 

FAMILY HISTORY:  No mention of major medical, surgical or oncologic problems in 
the family.

 

REVIEW OF SYSTEMS:  Other than the above-mentioned, there are no other 
pertinent positives or pertinent negatives in a complete 14-point review of 
systems.

 

PHYSICAL EXAMINATION:

GENERAL:  The patient appears to be a very pleasant  lady of non-
 descent, appearing stated age or perhaps slightly older, lying in bed 
comfortably and in no acute distress.  Her BMI is 26.9.

VITAL SIGNS:  Temperature is 98.5, blood pressure 100/58, pulse 94, respiratory 
rate 20, pulse oximetry 96% on 2 liters of nasal cannula.

HEENT:  Normocephalic and atraumatic.  Extraocular muscles and hearing are 
grossly intact bilaterally and symmetrically.  Sclerae are nonicteric.  Oral 
cavity is clear; oral mucosa appeared to be pink and moist.  Dentition: poor.

NECK:  Supple.  There is no lymphadenopathy or JVD.  There is no submental, 
submandibular or supraclavicular lymphadenopathy.

CHEST:  Rises symmetrically with each breath; patient is breathing comfortably.
  There are no audible wheezes, rales or rhonchi on the gross exam.

HEART:  Pulse is regular and palpable on the right wrist.  Capillary refill was 
normal.  Carotid pulses are palpable bilaterally and symmetrically in the neck.

EXTREMITIES:  Lower extremities contain no pitting edema around the ankles 
bilaterally and symmetrically.

ABDOMEN:  Soft, nondistended and mild to moderately tender to palpation mainly 
in the right upper quadrant.  There is no evidence of organomegaly, caput 
medusae, engorged subcutaneous veins, or ascites.  There are no peritoneal 
signs and no major guarding.

SKIN:  Appears to be pink and feels warm to touch.

NEUROLOGIC:  Awake, alert, and follows commands appropriately.

 

LABORATORY VALUES:  As mentioned above.

 

IMAGING:  As mentioned above.  Note that I personally reviewed all the 
available and pertinent images and I agree in general with their overall 
reported findings.

 

 

Dictated By: HASEEB MENJIVAR/JOSE ANGEL

DD:    2017 13:42:56

DT:    2017 14:21:47

Conf#: [id number]

DID#:  [id number]

 

MTDD

## 2017-05-18 NOTE — RADRPT
PROCEDURE:   MRI abdomen  without contrast; MRCP 

 

CLINICAL INDICATION:    abdominal pain  

 

TECHNIQUE:   Multiplanar, multisequence imaging of the abdomen was obtained without contrast.  Imagi
ng includes axial T3-T2 fat-saturated images with coronal T2-weighted images.

In addition, a dedicated high T2 signal intensity MRCP images were obtained in multiple planes with 
3-D reconstructions. 

 

COMPARISON:   CT 05/17/2017 

 

FINDINGS:

 

MRCP:

 

There is a dilated gallbladder present with generalized gallbladder wall thickening with mild perich
olecystic fluid which is indeterminate given the presence of mild ascites.  There is prominent diffu
se common duct enlargement measuring up to 19 mm in diameter and this extends down to the ampulla wh
ere there is abrupt narrowing at its insertion into the duodenum.  The pancreatic duct is at the upp
er limits of normal in size.  There is mild intrahepatic biliary ductal enlargement.  No gallstones 
are visible choledocholithiasis is seen on MRCP.

 

MRI abdomen:

 

Bilateral layering small pleural effusions are seen with borderline cardiomegaly partially visualize
d and bibasilar opacities appear grossly stable.

 

Again seen are inflammatory changes in the right upper quadrant of the abdomen not fully characteriz
e by this exam.  Wall thickening of the colon with prominent colonic distension with stool is again 
seen and fine detail of the bowel is limited with this technique.

 

The adrenal glands unremarkable pound mass.  There is atrophy of the right kidney and a lobular cont
our of the left kidney with areas of parenchymal loss.

 

There is grossly uniform signal intensity the pancreas however fine detail is limited.  Flow void is
 seen within the portal vein.  There is no focal hepatic lesion.

 

Aortic atherosclerosis is again seen.  Mesenteric fat stranding and intra-abdominal ascites is prese
nt.

 

 

 

IMPRESSION:

 

 

Common duct enlarged measuring up to 19 mm of mild intrahepatic biliary ductal dilatation and this e
xtends down to the ampulla with no visible filling defect.  This could represent ampullary stricture
 or an impacted stone which can be correlated with ERCP.

 

Mild wall thickening of the gallbladder is seen which is indeterminate given the presence of ascites
.  This could represent gallbladder inflammation or third spacing of fluid.

 

Cardiomegaly is seen of bilateral layering small effusions and mild intra-abdominal ascites.

 

Inflammatory changes of the right upper quadrant mesentery of the colon appear grossly stable from p
rior CT however fine detail is limited this technique.

 

Atherosclerotic disease.

 

 

 

 

RPTAT: AA

_____________________________________________ 

.Karo Major MD, MD           Date    Time 

Electronically viewed and signed by .Karo Major MD, MD on 05/18/2017 17:55 

 

D:  05/18/2017 17:55  T:  05/18/2017 17:55

.J/

## 2017-05-18 NOTE — CONS
Date/Time of Note


Date/Time of Note


DATE: 5/18/17 


TIME: 13:34





Assessment/Plan


Assessment/Plan


Additional Assessment/Plan


Assessment


* Abdominal pain


                   CT abdomen/pelvis 5/17/2017


                       The gallbladder is distended, there is cholelithiasis in 

the gallbladder wall appears thickened raising the possibility of cholecystitis.


                       The common bile duct is dilated to 1.1 cm through the 

head of the pancreas.  No choledocholith is evident and no pancreatic pathology 

is noted.    


                    .  Extensive stranding seen to the omentum in the region of 

the hepatic flexure and gallbladder compatible with edema and inflammatory 

change.


* HX of COPD


* Chronic Kidney disease





Plan


* Adequate hydration


* pain control


* MRCP 


* ERCP risk and benefit explained to patient and agreed with planned procedure





Consultation Date/Type/Reason


Admit Date/Time


 May 17, 2017


Date of Consultation:  May 18, 2017


Type of Consultation:  Gastroenterology


Reason for Consultation


choledocholithiasis


Referring Provider:  MARIA DEL ROSARIO WISDOM





Hx of Present Illness


62 year old female with past medical history of COPD,chronic kidney disease 

coming to our emergency room complaining of abdominal pain.Present condition 

started 2 days ago abdominal pain colicky with associated nausea and vomiting>

she denies any fever,hematemesis,hematochezia.


Emergency room a CT scan of abdomen/pelvis 5/17/2017 There is stool distension 

of the cecum, ascending and proximal transverse colon with bowel wall 

thickening at the hepatic flexure and proximal transverse colon with intramural 

air.  Differential possibilities include ischemic bowel as well as a benign 

etiologies.  No portal air is identified. Clinical correlation is indicated.  

There is no evidence of bowel obstruction. There is a hiatal hernia and 

evidence of previous gastric surgery.


 The gallbladder is distended, there is cholelithiasis in the gallbladder wall 

appears thickened raising the possibility of cholecystitis. The common bile 

duct is dilated to 1.1 cm through the head of the pancreas.  No choledocholith 

is evident and no pancreatic pathology is noted.  Extensive stranding seen to 

the omentum in the region of the hepatic flexure and gallbladder compatible 

with edema and inflammatory change..  There is no free intraperitoneal fluid or 

air..  Atrophic right kidney without hydronephrosis.  The left kidney is normal 

in size with cortical scarring and with two 2 mm nonobstructing nephroliths 

within the superior pole.  There is no hydronephrosis.


.  Borderline hepatomegaly.  5 mm central posterior disk protrusion at L5-S1.


CT angiogram 5/17/2017


T he celiac, SMA, and CORNELIUS are widely patent. No evidence of mesenteric ischemia.


 Markedly dilated common bile duct measuring up to 14 mm in diameter which 

abruptly terminates at the pancreatic head.  There is also mild intrahepatic 

biliary ductal dilatation as well as marked distension of the gallbladder.  

Findings suggest an obstructing lesion at the distal common bile duct which may 

be a choledocholith, pancreatic lesion, or an ampullary lesion.  An MRCP is 

recommended for further evaluation.


 Small hiatal hernia and postsurgical changes at the gastroesophageal junction.


 Colonic diverticula without evidence of diverticulitis.


Laboratory result revealed leukocytosis and elevated alkaline phosphatase.


Subsequent evaluation at the hospital .patient still on abdominal pain 

localized right upper quadrant  radiating to the back,denies any nausea and 

vomiting.


Eyes:  no complaints


ENT:  no complaints


Respiratory:  cough


Cardiovascular:  no complaints


Gastrointestinal:  flatus, nausea, pain, vomiting, 


   No blood, No constipation


Genitourinary:  no complaints


Musculoskeletal:  no complaints


Skin:  no complaints


Neurologic:  no complaints


Endocrine:  no complaints


Lymphatic:  no complaints


Psychological:  anxiety


Immunologic:  no complaints





Past Medical History


Medical History:  renal disease, other (copd)





Past Surgical History


Past Surgical Hx:  other (surgery sec to peptic ulcer disesase)





Family History


Significant Family History:  no pertinent family hx





Social History


Alcohol Use:  none


Smoking Status:  Current some day smoker


Drug Use:  none, other (oxycodone )





Exam/Review of Systems


Vital Signs


Vitals





 Vital Signs








  Date Time  Temp Pulse Resp B/P Pulse Ox O2 Delivery O2 Flow Rate FiO2


 


5/18/17 11:36  94 20  96 Nasal Cannula 2.0 


 


5/18/17 07:22 98.5   100/58    














 Intake and Output   


 


 5/17/17 5/17/17 5/18/17





 15:00 23:00 07:00


 


Intake Total  300 ml 1250 ml


 


Balance  300 ml 1250 ml











Exam


Constitutional:  alert, oriented, well developed


Head:  atraumatic, normocephalic


Eyes:  PERRL, nl sclera


Neck:  non-tender, supple


Respiratory:  congested cough, diminished breath sounds, normal air movement


Cardiovascular:  nl pulses, regular rate and rhythm


Gastrointestinal:  bowel sounds, distended, soft, tender, 


   No rebound or guarding


Musculoskeletal:  nl extremities to inspection, nl gait and stance


Extremities:  normal pulses


Neurological:  nl mental status, nl speech


Skin:  nl turgor, 


   No rash or lesions


Lymph:  nl lymph nodes





Results


Result Diagram:  


5/18/17 0724                                                                   

             5/18/17 0724





Results 24 hrs





Laboratory Tests








Test


  5/17/17


15:00 5/17/17


18:40 5/17/17


20:30 5/17/17


22:40


 


Urine Color YELLOW     


 


Urine Clarity CLEAR     


 


Urine pH 5.5     


 


Urine Specific Gravity 1.020     


 


Urine Ketones NEGATIVE     


 


Urine Nitrite NEGATIVE     


 


Urine Bilirubin NEGATIVE     


 


Urine Urobilinogen 0.2  E.U./dL     


 


Urine Leukocyte Esterase NEGATIVE     


 


Urine Microscopic RBC NONE SEEN     


 


Urine Microscopic WBC 0-2     


 


Urine Amorphous Urates MODERATE     


 


Urine Hemoglobin NEGATIVE     


 


Urine Glucose NEGATIVE     


 


Urine Total Protein 1+  H   


 


Prothrombin Time  15.0  H  


 


Prothrombin Time Ratio  1.2    


 


INR International Normalized


Ratio 


  1.17  


  


  


 


 


Activated Partial


Thromboplast Time 


  41.9  H


  


  


 


 


Lactic Acid Level  3.7  H 2.1   1.2  


 


Troponin I  < 0.012    














Test


  5/18/17


07:14 5/18/17


07:24 


  


 


 


Iron Level 19  L   


 


Total Iron Binding Capacity 201  L   


 


Percent Iron Saturation 9  L   


 


Ferritin 71.2     


 


Parathyroid Hormone (Intact)      


 


White Blood Count  12.1  #H  


 


Red Blood Count  3.31  L  


 


Hemoglobin  9.3  L  


 


Hematocrit  29.9  L  


 


Mean Corpuscular Volume  90.3    


 


Mean Corpuscular Hemoglobin  28.1  L  


 


Mean Corpuscular Hemoglobin


Concent 


  31.1  L


  


  


 


 


Red Cell Distribution Width  14.7  H  


 


Platelet Count  191    


 


Mean Platelet Volume  10.6  H  


 


Neutrophils %  83.0  H  


 


Band Neutrophils %  5.0    


 


Lymphocytes %  7.0  L  


 


Monocytes %  5.0    


 


Eosinophils %      


 


Neutrophils #  10.0  H  


 


Lymphocytes #  0.8    


 


Monocytes #  0.6    


 


Eosinophils #      


 


Sodium Level  134  L  


 


Potassium Level  5.1    


 


Chloride Level  105    


 


Carbon Dioxide Level  18  L  


 


Anion Gap  16    


 


Blood Urea Nitrogen  62  H  


 


Creatinine  2.57  H  


 


Glucose Level  90    


 


Hemoglobin A1c  5.6    


 


Uric Acid  6.2    


 


Calcium Level  7.9  L  


 


Phosphorus Level  6.1  H  


 


Magnesium Level  2.6  H  


 


Total Bilirubin  0.0  L  


 


Direct Bilirubin  0.00    


 


Indirect Bilirubin  0.0    


 


Aspartate Amino Transf


(AST/SGOT) 


  22  


  


  


 


 


Alanine Aminotransferase


(ALT/SGPT) 


  36  


  


  


 


 


Alkaline Phosphatase  204  H  


 


Total Protein  5.6  #L  


 


Albumin  2.6  #L  


 


Globulin  3.00    


 


Albumin/Globulin Ratio  0.86    


 


Triglycerides Level  84    


 


Cholesterol Level  66  L  


 


LDL Cholesterol, Calculated  18    


 


HDL Cholesterol  31  L  


 


Cholesterol/HDL Ratio  2.1    


 


Thyroid Stimulating Hormone


(TSH) 


  2.660  


  


  


 











Medications


Medications





 Current Medications


Ciprofloxacin/ Dextrose 200 ml @  200 mls/hr Q24H IVPB  Last administered on 5/ 17/17at 20:38; Admin Dose 200 MLS/HR;  Start 5/17/17 at 18:30


Metronidazole (Flagyl 500 Mg (Pmx)) 100 ml @  100 mls/hr Q8 IVPB  Last 

administered on 5/18/17at 05:14; Admin Dose 100 MLS/HR;  Start 5/17/17 at 22:00


Morphine Sulfate (morphine) 2 mg Q4H  PRN IV PAIN Last administered on 5/18/ 17at 10:51; Admin Dose 2 MG;  Start 5/17/17 at 18:30


Famotidine (Pepcid) 20 mg Q24H PO  Last administered on 5/17/17at 20:39; Admin 

Dose 20 MG;  Start 5/17/17 at 21:00


Docusate Sodium 100 mg 100 mg BID PO  Last administered on 5/17/17at 20:39; 

Admin Dose 100 MG;  Start 5/17/17 at 21:00


Dextrose/Sodium Chloride (D5-1/2ns) 1,000 ml @  100 mls/hr Q10H IV  Last 

administered on 5/18/17at 05:18; Admin Dose 100 MLS/HR;  Start 5/17/17 at 19:30

;  Stop 5/18/17 at 15:29


Miscellaneous Information Patients own medicat... BID@10,16 XX ;  Start 5/18/17 

at 10:00











VICK SNELL MD May 18, 2017 13:45

## 2017-05-18 NOTE — RADRPT
PROCEDURE:   US Renal 

 

CLINICAL INDICATION:   Acute renal insufficiency. 

 

TECHNIQUE:   Multiple sonographic images of the kidneys and bladder were obtained.  Evaluation of th
e kidneys and bladder was performed as well with gray scale and color and Doppler evaluation using a
 curved array transducer.  The images were reviewed on a high-resolution PACS workstation. 

 

COMPARISON:   No prior studies are available for comparison. 

 

FINDINGS:

 

The right kidney measures 6.4 cm. 

The left kidney measures 9.4 cm. 

Kidneys are small and echogenic in appearance. 

There is no mass, calculus, or obstructive uropathy.  

No perinephric fluid collection is seen. 

Evaluation of the urinary bladder is unremarkable.  

 

IMPRESSION:

 

1.  Small echogenic kidneys consistent with medical renal disease.   

 

 

 

RPTAT: AACC

_____________________________________________ 

Physician Edgadr           Date    Time 

Electronically viewed and signed by Physician Edgard on 05/18/2017 12:33 

 

D:  05/18/2017 12:33  T:  05/18/2017 12:33

/

## 2017-05-18 NOTE — CONS
DATE OF ADMISSION: 05/17/2017

DATE OF CONSULTATION:  05/18/2017

 

 

 

TYPE OF CONSULTATION:  Nephrology.

 

REASON FOR CONSULTATION:  Acute kidney injury and chronic kidney disease.

 

REQUESTING PHYSICIAN:  Dr. Sin.

 

HISTORY OF PRESENT ILLNESS:  This is a 62-year-old female with a past medical history of CKD stage I
V, baseline creatinine around 2 to 2.5 mg/dL, history of cholelithiasis, who presents to [facility] with complaints of abdominal pain.  The patient states that she has been having on
going abdominal pain, mid epigastric, for the last 2 days associated with nausea and vomiting.  Pain
 is aching, sharp, non-radiation.  The patient came to the emergency room for evaluation.  Upon arri
tico, the patient had a CT scan abdomen and pelvis which showed findings of distended gallbladder, th
ickened, possible cholecystitis as well as possible ischemic bowel.  The patient also noted to have 
atrophic right kidney with a normal sized left kidney.  The patient was placed on IV antibiotics, wa
s given pain medications and admitted to medical/surgical for evaluation.  Please note, in the emerg
ency room, the patient also had a CT angio with contrast which showed no evidence of mesenteric isch
emia or possibility of choledocholithiasis.  In terms of the patient's renal history, the patient sa
ys she has underlying chronic kidney disease with a unilateral functioning kidney.  The patient stat
es that she has 1 atrophic kidney.  The patient denies any recent NSAID use.  Denies any recent diur
etic use, denies any recent antibiotic use.  The patient denies any recent rashes, hemoptysis, hemet
emesis, hematochezia or any frothy urine.  The patient states she has underlying  CKD and believes h
er creatinine is around 2 to 2.5 mg/dL.

 

PAST MEDICAL HISTORY:  As stated above, history of CKD, history of kidney stones.

 

PAST SURGICAL HISTORY:  The patient has history of appendectomy, hysterectomy.

 

MEDICATIONS:  The patient's medications have been reviewed.

 

FAMILY HISTORY:  Noncontributory.

 

SOCIAL HISTORY:  Does not drink, smoke or do drugs.

 

REVIEW OF SYSTEMS:  A 14-point review of systems was conducted.  Pertinent positives in HPI, otherwi
se negative.

 

PHYSICAL EXAMINATION:

VITAL SIGNS:  Blood pressure is 100/58, respiration 18, pulse 87, temperature 98.5.

HEENT:  Head is normocephalic.  Pupils are reactive to light.

NECK:  Supple.

HEART:  Regular rate.

LUNGS:  Show diminished breath sounds at base, otherwise clear.

ABDOMEN:  Soft, positive tenderness to palpation, positive rebound, positive guarding.

EXTREMITIES:  Negative for clubbing, cyanosis, no edema.

MUSCULOSKELETAL:  No joint effusions.

NEUROLOGIC:  No focal deficits.

 

LABORATORY DATA:  Shows sodium 134, potassium 5.1, BUN 62, creatinine 2.57, phosphorus 6.1, magnesiu
m 2.6, calcium 7.9.  White count 12.1, hemoglobin 9.3, hematocrit 29.9, platelet count is 191.

 

IMAGING STUDIES:  As stated in HPI.

 

Ultrasound reviewed showed gallbladder wall thickening.  Chest x-ray reviewed.

 

ASSESSMENT AND PLAN:  This is a 62-year-old female who presents with:

 

1.  Nonoliguric acute kidney injury on top of chronic kidney disease stage IV with a unilateral func
tioning kidney.  Etiology of current acute kidney injury appears to be hemodynamics, possible sepsis
.  The patient's initial urinalysis is bland.  Nonactive sediment.  At this point, plan would be to 
repeat a UA with microanalysis.  Will recheck urine electrolytes, calculate FENa.  Patient's imaging
 study shows an atrophic right kidney and normal left kidney.  Will, however, check renal ultrasound
 for further evaluation of her underlying parenchyma.  Otherwise, continue current treatment plan.  
Continue IV fluids, continue antibiotics, avoid hemodynamic fluctuations.  Will otherwise continue s
upportive care, renally dose meds, avoid nephrotoxins, monitor closely with you.

2.  Chronic kidney disease stage IV with unilateral functioning kidney.  The patient has atrophic ri
ght kidney, which appears to have been present for several years.  Unclear if this is congenital or 
due to obstructive ureterolithiasis.  Plan at this point is to continue treating acute kidney injury
 as stated above, will check renal ultrasound and monitor closely.

3.  Mineral bone disorder.  The patient is hypophosphatemic secondary to acute kidney injury and chr
onic kidney disease.  Continue to monitor calcium and phosphorus levels.

4.  Mild hyponatremia.  Continue to monitor sodium levels closely. Limit free water intake.

5.  Hypermagnesemia secondary to acute kidney injury.  We will continue to monitor.

6.  Anemia.  Continue to monitor hemoglobin and hematocrit levels.

7.  Acute cholecystitis with possible underlying choledocholithiasis.  The patient is currently on I
V antibiotics.  GI consult and a general surgical consults pending.  We will continue.

8.  Leukocytosis, likely secondary to acute cholecystitis.  Possible sepsis.  The patient is current
ly on broad spectrum antibiotics.  Cultures have been sent.  Continue to monitor.  

9.  Gastrointestinal and deep venous thrombosis prophylaxis.

 

Thank you, Dr. Sin, for this interesting consult.  It will be a pleasure to follow patient with you 
throughout the hospital course.

 

 

Dictated By: EZRA CAMPUZANO/JOSE ANGEL

DD:    05/18/2017 11:34:44

DT:    05/18/2017 12:05:10

Conf#: [id number]

DID#:  [id number]

## 2017-05-19 NOTE — RADRPT
PROCEDURE:   Portable chest x-ray. 

 

CLINICAL INDICATION:   Nasogastric tube placement. 

 

TECHNIQUE:   Portable AP view of the chest. 

 

COMPARISON:   05/17/2017 at 05:48 p.m..

 

FINDINGS:

A nasogastric tube terminates in the region of the esophagogastric junction.  There is vascular marci
estion and pulmonary edema, not significantly changed. There is mild bibasilar atelectasis.   The ca
rdiac silhouette is enlarged. There are aortic calcifications. The vascular stent projects over the 
left paratracheal region. There are probable small bilateral pleural effusions.  There is no pneumot
horax.  Multiple surgical clips project over the epigastrium.

 

IMPRESSION:

1.  Nasogastric tube tip in the region of the esophagogastric junction.  Recommend advancement.

2.  Vascular congestion and pulmonary edema, not significantly changed. 

3.  Mildly enlarged cardiac silhouette and aortic atherosclerosis.

4.  Probable small bilateral pleural effusions.

 

 

 

 

RPTAT: HTAR

_____________________________________________ 

.Spencer Wilkes MD, MD           Date    Time 

Electronically viewed and signed by .Spencer Wilkes MD, MD on 05/19/2017 19:44 

 

D:  05/19/2017 19:44  T:  05/19/2017 19:44

.R/

## 2017-05-19 NOTE — PN
Date/Time of Note


Date/Time of Note


DATE: 5/19/17 


TIME: 11:51





Assessment/Plan


VTE Prophylaxis


VTE Prophylaxis Intervention:  SCD's





Lines/Catheters


IV Catheter Type (from [facility]):  Peripheral IV


Urinary Cath still in place:  No





Assessment/Plan


Chief Complaint/Hosp Course


1.  Symptomatic cholelithiasis with possible underlying choledocholithiasis.  

Continue pain control.  Continue empiric antibiotics.  The patient is being 

evaluated by gastroenterology and general surgery.  The patient is scheduled 

for an ERCP today.





2.  Leukocytosis.  Most probably secondary to #1.  No evidence of any septic 

shock.  Continue empiric antibiotics.





3.  Nicotine use.  The patient is trying to quit nicotine use.  The patient 

refused a nicotine patch.





4.  Chronic kidney disease.  The patient verbalized that she has known history 

of chronic kidney disease and she was following a nephrologist in [facility].   Patient being followed by nephrology.





5.  Normocytic hypochromic anemia.  Etiology unclear,  most probably secondary 

to underlying chronic kidney disease.  Continue to monitor H&H closely.  Iron 

panel showing iron deficiency.  Will start the patient on iron supplements.





6.  COPD.  No evidence of exacerbation.  Continue inhaled bronchodilators.  

Will start the patient on maintenance inhalers.





7.  Fluid, electrolytes and nutrition.  Currently NPO.  Continue on IV fluids.  





8.  Deep venous thrombosis prophylaxis.  Bilateral sequential compression 

devices.





9.  Gastrointestinal prophylaxis.  Histamine 2 receptor blockers.  


 


PLAN:  Continue pain control.  Continue IV hydration.  Continue antibiotics.  

Start maintenance inhalers.  Start iron supplements.


 


Case discussed with Dr. Sin.


Problems:  





Subjective


24 Hr Interval Summary


Free Text/Dictation


Still having abdominal pain.





Exam/Review of Systems


Vital Signs


Vitals





 Vital Signs








  Date Time  Temp Pulse Resp B/P Pulse Ox O2 Delivery O2 Flow Rate FiO2


 


5/19/17 10:08  92 16  96 Nasal Cannula 2.0 


 


5/19/17 08:00 98.6   110/51    














 Intake and Output   


 


 5/18/17 5/18/17 5/19/17





 15:00 23:00 07:00


 


Intake Total 100 ml 1550 ml 418 ml


 


Output Total  650 ml 1000 ml


 


Balance 100 ml 900 ml -582 ml











Exam


GENERAL:  This is a 62-year-old female patient lying in bed in no apparent 

distress.


HEENT:  Head normocephalic and atraumatic.  Eyes:  Anicteric sclerae.  

Conjunctivae clear.


ENT:  Nasal septum is midline.  Oral mucosa is dry.


NECK:  Supple.  No JVD noticed.


RESPIRATORY:  Bilateral diminished breath sounds.  No use of accessory muscles 

of respiration.  Bilateral scattered rhonchi.


CARDIAC:  Regular rate and rhythm.  No murmurs heard.


ABDOMEN:  Soft.  Diffuse tenderness especially in the right upper quadrant.  No 

guarding.


GENITOURINARY:  Deferred.


EXTREMITIES:  No cyanosis, no clubbing, no edema.  Peripheral pulses palpable.


NEUROLOGIC:  The patient is awake, alert and oriented.  Cranial nerves are 

grossly intact.





Results


Result Diagram:  


5/19/17 0430                                                                   

             5/19/17 0430





Results 24 hrs





Laboratory Tests








Test


  5/18/17


14:00 5/19/17


04:30 5/19/17


04:55


 


Urine Color YELLOW    


 


Urine Clarity CLEAR    


 


Urine pH 5.5    


 


Urine Specific Gravity 1.010    


 


Urine Ketones NEGATIVE    


 


Urine Nitrite NEGATIVE    


 


Urine Bilirubin NEGATIVE    


 


Urine Urobilinogen 0.2  E.U./dL    


 


Urine Leukocyte Esterase NEGATIVE    


 


Urine Microscopic RBC 0-2    


 


Urine Microscopic WBC 0-2    


 


Urine Epithelial Cells RARE    


 


Urine Bacteria RARE    


 


Urine Hemoglobin NEGATIVE    


 


Urine Random Creatinine 78.67    


 


Urine Random Sodium 21  L  


 


Urine Glucose NEGATIVE    


 


Urine Total Protein 40.0  H  


 


White Blood Count  14.0  H 


 


Red Blood Count  3.28  L 


 


Hemoglobin  9.2  L 


 


Hematocrit  29.1  L 


 


Mean Corpuscular Volume  88.7   


 


Mean Corpuscular Hemoglobin  28.0  L 


 


Mean Corpuscular Hemoglobin


Concent 


  31.6  L


  


 


 


Red Cell Distribution Width  14.7  H 


 


Platelet Count  177   


 


Mean Platelet Volume  10.8  H 


 


Neutrophils %  85.1  H 


 


Lymphocytes %  8.2  L 


 


Monocytes %  6.1   


 


Eosinophils %  0.2   


 


Basophils %  0.1   


 


Nucleated Red Blood Cells %  0.0   


 


Neutrophils #  11.9  H 


 


Lymphocytes #  1.2   


 


Monocytes #  0.9   


 


Eosinophils #  0.0   


 


Basophils #  0.0   


 


Nucleated Red Blood Cells #  0.0   


 


Sodium Level  134  L 


 


Potassium Level  4.5   


 


Chloride Level  107   


 


Carbon Dioxide Level  16  L 


 


Anion Gap  16   


 


Blood Urea Nitrogen  48  #H 


 


Creatinine  2.15  H 


 


Glucose Level  91   


 


Calcium Level  8.4   


 


Total Bilirubin  0.0  L 


 


Direct Bilirubin  0.00   


 


Indirect Bilirubin  0.0   


 


Aspartate Amino Transf


(AST/SGOT) 


  18  


  


 


 


Alanine Aminotransferase


(ALT/SGPT) 


  33  


  


 


 


Alkaline Phosphatase  217  H 


 


Total Protein  5.8  L 


 


Albumin  2.6  L 


 


Globulin  3.20   


 


Albumin/Globulin Ratio  0.81   


 


Magnesium Level   2.6  H











Medications


Medications





 Current Medications


Ciprofloxacin/ Dextrose 200 ml @  200 mls/hr Q24H IVPB  Last administered on 5/ 18/17at 17:50; Admin Dose 200 MLS/HR;  Start 5/17/17 at 18:30


Metronidazole (Flagyl 500 Mg (Pmx)) 100 ml @  100 mls/hr Q8 IVPB  Last 

administered on 5/19/17at 04:49; Admin Dose 100 MLS/HR;  Start 5/17/17 at 22:00


Morphine Sulfate (morphine) 2 mg Q4H  PRN IV PAIN Last administered on 5/19/ 17at 10:40; Admin Dose 2 MG;  Start 5/17/17 at 18:30


Famotidine (Pepcid) 20 mg Q24H PO  Last administered on 5/18/17at 21:03; Admin 

Dose 20 MG;  Start 5/17/17 at 21:00


Docusate Sodium (Colace) 100 mg BID PO  Last administered on 5/19/17at 08:19; 

Admin Dose 100 MG;  Start 5/17/17 at 21:00


Miscellaneous Information Patients own medicat... BID@10,16 XX ;  Start 5/18/17 

at 10:00


Sodium Chloride (NS) 1,000 ml @  75 mls/hr [id number] IV  Last administered on 5/19 /17at 10:40; Admin Dose 75 MLS/HR;  Start 5/19/17 at 09:30











DAMASO CHAIREZ NP May 19, 2017 11:52

## 2017-05-19 NOTE — PN
DATE:  05/19/2017

 

 

SUBJECTIVE:  The patient is stable.  Continues to have abdominal pain.  The patient informed me that
 she was only born with 1 functional kidney.  No other events noted.  No hemoptysis, hematemesis or 
hematochezia.

 

OBJECTIVE:

VITAL SIGNS:  Blood pressure is 93/52, respirations 18, pulse 96, temperature 98.8.

I's and O's:  The patient had 2 L in, 1.6 liters out.

HEENT:  Head is normocephalic.  Pupils are reactive to light.

NECK:  Supple.

HEART:  Regular rate.

LUNGS:  Show diminished breath sounds at the bases.

ABDOMEN:  Soft, positive tenderness to palpation right upper quadrant.  Positive rebound and guardin
g. 

EXTREMITIES:  Negative for clubbing, cyanosis or edema.

DERMATOLOGIC:  No rashes.

MUSCULOSKELETAL:  No joint effusions.

NEUROLOGIC:  No focal deficits.

 

MEDICATIONS:  The patient's medications have been reviewed.

 

IMAGING STUDIES:  Shows an abdominal MRI that shows mild wall thickening of the gallbladder, represe
nting gallbladder inflammation, third spacing, cardiomegaly with bilateral layering of small effusio
ns, mild intra-abdominal ascites.  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
mpacted stone.  Renal ultrasound shows small echogenic kidneys consistent with medical renal disease
.

 

LABORATORY DATA:  Showed sodium 134, potassium 4.5, chloride 107, BUN 48, creatinine 2.15.  White co
unt is 14.0, hemoglobin 9.2, hematocrit 29.1, platelet count is 177.

 

ASSESSMENT AND PLAN:

1.  Nonoliguric acute kidney injury on top of chronic kidney disease stage IV with a unilateral func
tioning kidney.  Etiology of acute kidney injury is secondary to hemodynamics.  The patient's renal 
ultrasound shows no evidence of hydronephrosis, small echogenic kidneys consistent with renal diseas
e.  The patient's urinalysis shows no active sediment and FENa of less than 1%.  The patient's renal
 function has improved with IV hydration.  At this point, we will continue current treatment plan.  
Continue gentle IV hydration.  Continue treating underlying sepsis with antibiotic therapy.  Continu
e supportive care, renally dose all meds, avoid nephrotoxins.

2.  Chronic kidney disease stage IV with unilateral functioning kidney.  The patient was born with a
n atrophic right kidney.  At this point, we will continue to treat acute kidney injury as stated abo
ve.  Continue supportive care, renally dose all meds, avoid nephrotoxins.

3.  Mineral bone disease.  The patient is hyperphosphatemic secondary to acute kidney injury and chr
onic kidney disease.  Continue to monitor.  No need for phosphate binders.

4.  Mild hyponatremia.  Continue to monitor sodium levels.

5.  Hypermagnesemia secondary to acute kidney injury.  Continue to monitor.

6.  Anemia.  Continue to monitor hemoglobin and hematocrit levels.

7.  Acute cholecystitis, possible choledocholithiasis.  The patient is currently on IV antibiotics, 
we will continue.  The patient is pending ERCP today.

8.  Leukocytosis secondary to acute cholecystitis.  Continue to monitor.  Continue antibiotic therap
y.

9.  Gastrointestinal and deep venous thrombosis prophylaxis.  Continue proton pump inhibitor and seq
uential leg squeezers.

 

 

Dictated By: EZRA CAMPUZANO/JOSE ANGEL

DD:    05/19/2017 09:15:58

DT:    05/19/2017 09:36:07

Conf#: [id number]

DID#:  [id number]

## 2017-05-19 NOTE — PN
Date/Time of Note


Date/Time of Note


DATE: 5/19/17 


TIME: 17:48





Assessment/Plan


Lines/Catheters


IV Catheter Type (from [facility]):  Peripheral IV


Liu in Place (from [facility]):  No





Assessment/Plan


Assessment/Plan








[facility] Progress Note





Date of Service: 5/19/17





Today's Impression & Plan:





Overall stable. Observed ERCP with Dr. Smith. Benign appearing stricture. Plan 

is to stent and observe. I will remove the gallbladder in 48-72 hours given 

renal insufficiency and need for further resuscitation.





With above assessment, I've recommended the following for today:





1. Cont aggressive medical management


2. Will delay scheduled case from tomorrow am to Monday





Thank you again for your great care of this very pleasant patient and wonderful 

family. If there are any questions, please feel free to call me at [phone].


 


TOTAL VISIT TIME: 20 minutes of which more than half was spent in the operating 

room, as well as coordination of care between multiple physicians and providers.





Disclaimer: Inadvertent spelling or grammatical errors are likely due to EHR/

dictation software use and do not reflect on the overall quality of patient 

care.





Updated Clinical Summary:





A very pleasant 62-year-old lady admitted through the [facility] Emergency Room on 05/17/2017 with evidence for possible acute 

cholecystitis as well as choledocholithiasis.


 


COMORBIDITIES:


1.  BMI 26.9.


2.  Smoking history.


3.  Renal insufficiency.


4.  History of known cholelithiasis.


5.  Status post appendectomy.


6.  Status post hysterectomy.





Subjective: 





No major reported events or complaints; patient under general anesthesia 

undergoing ERCP





Objective:





Vitals: See below





Exam: 





Unable to examine. Patient under general anesthesia undergoing ERCP





Exam/Review of Systems


Vital Signs


Vitals





 Vital Signs








  Date Time  Temp Pulse Resp B/P Pulse Ox O2 Delivery O2 Flow Rate FiO2


 


5/19/17 16:06       2.0 


 


5/19/17 14:20  90 20  95 Nasal Cannula  


 


5/19/17 08:00 98.6   110/51    














 Intake and Output   


 


 5/18/17 5/18/17 5/19/17





 15:00 23:00 07:00


 


Intake Total 100 ml 1550 ml 418 ml


 


Output Total  650 ml 1000 ml


 


Balance 100 ml 900 ml -582 ml











Results


Result Diagram:  


5/19/17 0430                                                                   

             5/19/17 0430














HASEEB ROBLES M.D. May 19, 2017 17:51

## 2017-05-19 NOTE — RADRPT
PROCEDURE:   Portable chest x-ray. 

 

CLINICAL INDICATION:   Nasogastric tube placement. 

 

TECHNIQUE:   Portable AP view of the chest. 

 

COMPARISON:   05/19/2079 07:04 p.m..

 

FINDINGS:

A nasogastric tube terminates in the stomach.  There is vascular congestion and pulmonary edema, not
 significantly changed. There is mild right basilar atelectasis.   The cardiac silhouette is enlarge
d. There are aortic calcifications. The vascular stent projects over the left paratracheal region. T
here are probable small bilateral pleural effusions.  There is no pneumothorax.  Multiple surgical c
lips project over the epigastrium.

 

IMPRESSION:

1.  Nasogastric tube tip in the stomach.

2.  Vascular congestion and pulmonary edema, not significantly changed. 

3.  Mildly enlarged cardiac silhouette and aortic atherosclerosis.

4.  Probable small bilateral pleural effusions.

 

 

 

 

RPTAT: HTAR

_____________________________________________ 

.Spencer Wilkes MD, MD           Date    Time 

Electronically viewed and signed by .Spencer Wilkes MD, MD on 05/19/2017 19:45 

 

D:  05/19/2017 19:45  T:  05/19/2017 19:45

.R/

## 2017-05-20 NOTE — PN
Date/Time of Note


Date/Time of Note


DATE: 5/20/17 


TIME: 12:36





Assessment/Plan


VTE Prophylaxis


VTE Prophylaxis Intervention:  SCD's





Lines/Catheters


IV Catheter Type (from [facility]):  Peripheral IV


Urinary Cath still in place:  No





Assessment/Plan


Assessment/Plan


* Abdominal pain


                   Cholecystitis  with chololithiasis


                       ERCP 5/19/2017


                          Small 6 mm antral gastric ulceration, benign 

endoscopic appearance.


                          Normal ampulla of Vater.


                   .  Normal partially filled pancreatogram with exception of 

slight dilatation in the head of the pancreas.


                .  Markedly dilated common bile duct, we no definitive stones 

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 

and 


                       brushings.


                 Post-placement of 10-Estonian 7 cm stent with excellent drainage


* Hx of COPD


* Chronic kidney disease





Plan


* awaiting biopsy,CEA,Ca19-9 results


* continue present management





Subjective


24 Hr Interval Summary


Free Text/Dictation


* Course reviewed with RN


* Patient seen and examined


* ERCP 5/19/2017


                    Small 6 mm antral gastric ulceration, benign endoscopic 

appearance.


                 .  Normal ampulla of Vater.


                   Normal partially filled pancreatogram with exception of 

slight dilatation in the head of the pancreas.


             .  Markedly dilated common bile duct, we no definitive stones or 

intraductal pathology suggestive of ampullary stricture post sphincterotomy and 

brushings.


             .  Post-placement of 10-Estonian 7 cm stent with excellent drainage.


* denies abdominal pain





Exam/Review of Systems


Vital Signs


Vitals





 Vital Signs








  Date Time  Temp Pulse Resp B/P Pulse Ox O2 Delivery O2 Flow Rate FiO2


 


5/20/17 11:00  84 18 151/70 98 Nasal Cannula 2.0 


 


5/20/17 08:01        32


 


5/20/17 08:00 97.4       














 Intake and Output   


 


 5/19/17 5/19/17 5/20/17





 15:00 23:00 07:00


 


Intake Total  500 ml 2000 ml


 


Output Total  550 ml 700 ml


 


Balance  -50 ml 1300 ml











Exam


Constitutional:  alert, frail


Eyes:  nl conjunctiva, nl sclera


Neck:  non-tender, supple


Respiratory:  congested cough, diminished breath sounds


Cardiovascular:  nl pulses, regular rate and rhythm


Gastrointestinal:  nl liver, spleen, non-tender, soft


Musculoskeletal:  nl extremities to inspection


Extremities:  normal pulses





Results


Result Diagram:  


5/20/17 0440                                                                   

             5/20/17 0440





Results 24 hrs





Laboratory Tests








Test


  5/19/17


19:00 5/20/17


04:40


 


Blood Gas Specimen Source


  Blood arterial


  


 


 


Arterial Blood Date Drawn


  5/19/2017


7:25:17 PM 


 


 


Arterial Blood pH (Temp


corrected) 7.242  *L


  


 


 


Arterial Blood pCO2 (Temp


correct) 39.7  


  


 


 


Arterial Blood pO2 (Temp


corrected) 77.3  L


  


 


 


Arterial Blood HCO3 16.7  L 


 


Arterial Blood Base Excess -9.9  L 


 


Arterial Blood Oxygen


Saturation 93.2  L


  


 


 


Manav Test ACCEPTAB   


 


Arterial Blood Gas Puncture


Site Right Radial  


  


 


 


Arterial Blood


Carboxyhemoglobin 0.4  


  


 


 


Arterial Blood Methemoglobin 0.1   


 


Blood Gas A-a O2 Differential 126.1  H 


 


Oxyhemoglobin Percent 92.7  L 


 


Total Hemoglobin 8.7  L 


 


Blood Gas Temperature 37.0   


 


Blood Gas Respiration Rate 15.0   


 


Blood Gas Actual Respiration


Rate 17  


  


 


 


Blood Gas Modality MASK - BIPAP   


 


FiO2 35.0   


 


Blood Gas IPAP/EPAP Ratio 20/5   


 


Blood Gas Critical Value Read


Back A CRISTINA BEAVER  


  


 


 


Blood Gas Notified Whom    


 


Blood Gas Notified Time


  5/19/2017


7:31:11 PM 


 


 


White Blood Count  13.9  H


 


Red Blood Count  3.12  L


 


Hemoglobin  8.9  L


 


Hematocrit  27.3  L


 


Mean Corpuscular Volume  87.5  


 


Mean Corpuscular Hemoglobin  28.5  L


 


Mean Corpuscular Hemoglobin


Concent 


  32.6  


 


 


Red Cell Distribution Width  14.9  H


 


Platelet Count  201  


 


Mean Platelet Volume  10.3  


 


Neutrophils %  87.8  H


 


Lymphocytes %  5.3  L


 


Monocytes %  5.6  


 


Eosinophils %  0.0  


 


Basophils %  0.1  


 


Nucleated Red Blood Cells %  0.0  


 


Neutrophils #  12.2  H


 


Lymphocytes #  0.7  L


 


Monocytes #  0.8  


 


Eosinophils #  0.0  


 


Basophils #  0.0  


 


Nucleated Red Blood Cells #  0.0  


 


Sodium Level  138  


 


Potassium Level  4.7  


 


Chloride Level  111  H


 


Carbon Dioxide Level  16  L


 


Anion Gap  16  


 


Blood Urea Nitrogen  37  #H


 


Creatinine  1.75  H


 


Glucose Level  109  


 


Calcium Level  8.1  L


 


Phosphorus Level  5.0  H


 


Magnesium Level  2.3  


 


Carcinoembryonic Antigen  4.8  


 


CA 19-9 Antigen  8.5  











Medications


Medications





 Current Medications


Ciprofloxacin/ Dextrose 200 ml @  200 mls/hr Q24H IVPB  Last administered on 5/ 18/17at 17:50; Admin Dose 200 MLS/HR;  Start 5/17/17 at 18:30


Metronidazole (Flagyl 500 Mg (Pmx)) 100 ml @  100 mls/hr Q8 IVPB  Last 

administered on 5/20/17at 05:22; Admin Dose 100 MLS/HR;  Start 5/17/17 at 22:00


Morphine Sulfate (morphine) 2 mg Q4H  PRN IV PAIN Last administered on 5/20/ 17at 09:45; Admin Dose 2 MG;  Start 5/17/17 at 18:30


Famotidine (Pepcid) 20 mg Q24H PO  Last administered on 5/20/17at 01:01; Admin 

Dose 20 MG;  Start 5/17/17 at 21:00


Docusate Sodium (Colace) 100 mg BID PO  Last administered on 5/20/17at 08:49; 

Admin Dose 100 MG;  Start 5/17/17 at 21:00


Miscellaneous Information Patients own medicat... BID@10,16 XX ;  Start 5/18/17 

at 10:00


Sodium Chloride (NS) 1,000 ml @  75 mls/hr [id number] IV  Last administered on 5/19 /17at 23:12; Admin Dose 75 MLS/HR;  Start 5/19/17 at 09:30


Salmeterol Xinafoate/ Fluticasone 1 inh 1 inh BID INH  Last administered on 5/20 /17at 10:42; Admin Dose 1 INH;  Start 5/19/17 at 13:00


Ferric Sodium Gluconate Complex/ Sodium Chloride (Ferrlecit/NS) 110 ml @  100 

mls/hr Q24H IVPB  Last administered on 5/19/17at 15:41; Admin Dose 100 MLS/HR;  

Start 5/19/17 at 14:00;  Stop 5/21/17 at 15:05











VICK SNELL MD May 20, 2017 12:46

## 2017-05-20 NOTE — GILP
DATE OF PROCEDURE:  

 

 

NAME OF PROCEDURE:  Endoscopic retrograde cholangiopancreatography with endoscopic retrograde sphinc
terotomy, brushings in the distal common bile duct and stent placement.

 

SURGEON: Anni Smith MD.

 

PREMEDICATION: General anesthesia by Anesthesiologist.  

 

INSTRUMENT USED: Olympus side-view panendoscope.

 

TECHNIQUE: After informed consent, with the patient/relatives understanding the procedure, its indic
ations, potential risks and complications, including but not limited to: allergic reaction, bleeding
, perforation or infection, and after all pertinent questions were answered to the patients satisfac
tion, the patient/relatives signed witnessed informed consent. 

Following this, premedication was administered slowly IV push under careful cardiovascular and respi
ratory monitoring with pulse oximetry, automatic blood pressure and EKG monitor.

Once the sedative effect was achieved the patient was place in the prone position in the radiology s
pecial procedures suite; the side viewing panendoscope was introduced and advanced under visual cont
rol.

Careful examination of the upper gastrointestinal tract, both on insertion as well as withdrawal of 
the instrument disclosed the following findings:

 

ESOPHAGUS: The mucosa of the entire esophagus appears within normal limits. There is no evidence of 
esophagitis, varices, neoplasm or stricture. No Hiatal Hernia identified.

 

STOMACH: Upon entrance to the stomach.  There is a small antral gastric ulceration rather superficia
l, measuring approximately 6 mm.

 

PYLORUS: The pylorus appears patent and within normal limits.

 

DUODENUM: The duodenum appears unremarkable.  The ampulla was identified and appears small and other
wise unremarkable.

 

AMPULLA OF VATER: The ampulla was cannulated, opacifying the pancreatic duct which was purposely not
 filled to capacity and appears slightly dilated, but with no significant abnormalities.  We then se
lectively cannulated the biliary tree.  The biliary tree is massively dilated to at least 18 mm in m
aximum diameter.  However, there is no clear intraductal abnormalities.  A balloon catheter measurin
g 15- 18 mm was utilized to sweep the biliary tree an attempt to identify any floating stones could 
be present, that may be obscured by the significant dilatation of the biliary tree.  There appeared 
to be no intraductal pathology and the distal common bile duct and ____ regular fashion which is not
 consistent with a neoplastic process.  A small sphincterotomy was performed and upon doing this a l
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  The ar
ea was brushed for cytology.  Following this, a 10-Israeli 7 cm stent was placed to guarantee adequat
e drainage.

 

IMPRESSION:

1.  Small 6 mm antral gastric ulceration, benign endoscopic appearance.

2.  Normal ampulla of Vater.

3.  Normal partially filled pancreatogram with exception of slight dilatation in the head of the amin
creas.

4.  Markedly dilated common bile duct, we no definitive stones or intraductal pathology suggestive o
f ampullary stricture post sphincterotomy and brushings.

5.  Post-placement of 10-Israeli 7 cm stent with excellent drainage.

 

PLAN:  Review the cytology.  Monitor LFTs, obtain CA 19-9 and CEA titers.  Further recommendations w
ill depend on the patient's clinical course, cholecystectomy if the patient is stable to undergo freddy
kateryna.

 

 

Dictated By: ANNI WARD

DD:    05/19/2017 18:09:47

DT:    05/20/2017 09:23:20

Conf#: [id number]

DID#:  [id number]

## 2017-05-20 NOTE — CONS
Date/Time of Note


Date/Time of Note


DATE: 5/20/17 


TIME: 10:31





Consult Date/Type/Reason


Admit Date/Time


May 17, 2017 at 18:05


Initial Consult Date


5/18/17


Type of Consultation:  neph


Ordering Provider:  MARIA DEL ROSARIO WISDOM


This is a 62-year-old female with a past medical history of CKD stage IV, 

baseline creatinine around 2 to 2.5 mg/dL, history of cholelithiasis, who 

presents to [facility] with complaints of abdominal pain.  

The patient states that she has been having ongoing abdominal pain, mid 

epigastric, for the last 2 days associated with nausea and vomiting.  Pain is 

aching, sharp, non-radiation.  The patient came to the emergency room for 

evaluation.  Upon arrival, the patient had a CT scan abdomen and pelvis which 

showed findings of distended gallbladder, thickened, possible cholecystitis as 

well as possible ischemic bowel.  The patient also noted to have atrophic right 

kidney with a normal sized left kidney.  The patient was placed on IV 

antibiotics, was given pain medications and admitted to medical/surgical for 

evaluation.  Please note, in the emergency room, the patient also had a CT 

angio with contrast which showed no evidence of mesenteric ischemia or 

possibility of choledocholithiasis.  In terms of the patient's renal history, 

the patient says she has underlying chronic kidney disease with a unilateral 

functioning kidney.  The patient states that she has 1 atrophic kidney.  The 

patient denies any recent NSAID use.  Denies any recent diuretic use, denies 

any recent antibiotic use.  The patient denies any recent rashes, hemoptysis, 

hemetemesis, hematochezia or any frothy urine.  The patient states she has 

underlying  CKD and believes her creatinine is around 2 to 2.5 mg/dL.  


 





seen by gi and sp egd


1.  Small 6 mm antral gastric ulceration, benign endoscopic appearance.


2.  Normal ampulla of Vater.


3.  Normal partially filled pancreatogram with exception of slight dilatation 

in the head of the pancreas.


4.  Markedly dilated common bile duct, we no definitive stones or intraductal 

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


5.  Post-placement of 10-[place] 7 cm stent with excellent drainage.





continues good uo. poc reviewed with dr. hyatt.





 


MEDICATIONS:  The patient's medications have been reviewed.


 





 


REVIEW OF SYSTEMS:  A 14-point review of systems was conducted.  Pertinent 

positives in HPI, otherwise negative.


 


PHYSICAL EXAMINATION:


VITAL SIGNS:  Blood pressure is 100/58, respiration 18, pulse 87, temperature 

98.5.


HEENT:  Head is normocephalic.  Pupils are reactive to light.


NECK:  Supple.


HEART:  Regular rate.


LUNGS:  Show diminished breath sounds at base, otherwise clear.


ABDOMEN:  Soft, positive tenderness to palpation, positive rebound, positive 

guarding.


EXTREMITIES:  Negative for clubbing, cyanosis, no edema.


MUSCULOSKELETAL:  No joint effusions.


NEUROLOGIC:  No focal deficits.





Objective





 Vital Signs








  Date Time  Temp Pulse Resp B/P Pulse Ox O2 Delivery O2 Flow Rate FiO2


 


5/20/17 09:00  87 19 95/65 98 Nasal Cannula 2.0 


 


5/20/17 08:01        32


 


5/20/17 08:00 97.4       














 Intake and Output   


 


 5/19/17 5/19/17 5/20/17





 15:00 23:00 07:00


 


Intake Total  500 ml 2000 ml


 


Output Total  550 ml 700 ml


 


Balance  -50 ml 1300 ml











Results/Medications


Result Diagram:  


5/20/17 0440                                                                   

             5/20/17 0440





Results 24 hrs





Laboratory Tests








Test


  5/19/17


19:00 5/20/17


04:40


 


Blood Gas Specimen Source


  Blood arterial


  


 


 


Arterial Blood Date Drawn


  5/19/2017


7:25:17 PM 


 


 


Arterial Blood pH (Temp


corrected) 7.242  *L


  


 


 


Arterial Blood pCO2 (Temp


correct) 39.7  


  


 


 


Arterial Blood pO2 (Temp


corrected) 77.3  L


  


 


 


Arterial Blood HCO3 16.7  L 


 


Arterial Blood Base Excess -9.9  L 


 


Arterial Blood Oxygen


Saturation 93.2  L


  


 


 


Manav Test ACCEPTAB   


 


Arterial Blood Gas Puncture


Site Right Radial  


  


 


 


Arterial Blood


Carboxyhemoglobin 0.4  


  


 


 


Arterial Blood Methemoglobin 0.1   


 


Blood Gas A-a O2 Differential 126.1  H 


 


Oxyhemoglobin Percent 92.7  L 


 


Total Hemoglobin 8.7  L 


 


Blood Gas Temperature 37.0   


 


Blood Gas Respiration Rate 15.0   


 


Blood Gas Actual Respiration


Rate 17  


  


 


 


Blood Gas Modality MASK - BIPAP   


 


FiO2 35.0   


 


Blood Gas IPAP/EPAP Ratio 20/5   


 


Blood Gas Critical Value Read


Back A CRISTINA BEAVER  


  


 


 


Blood Gas Notified Whom    


 


Blood Gas Notified Time


  5/19/2017


7:31:11 PM 


 


 


White Blood Count  13.9  H


 


Red Blood Count  3.12  L


 


Hemoglobin  8.9  L


 


Hematocrit  27.3  L


 


Mean Corpuscular Volume  87.5  


 


Mean Corpuscular Hemoglobin  28.5  L


 


Mean Corpuscular Hemoglobin


Concent 


  32.6  


 


 


Red Cell Distribution Width  14.9  H


 


Platelet Count  201  


 


Mean Platelet Volume  10.3  


 


Neutrophils %  87.8  H


 


Lymphocytes %  5.3  L


 


Monocytes %  5.6  


 


Eosinophils %  0.0  


 


Basophils %  0.1  


 


Nucleated Red Blood Cells %  0.0  


 


Neutrophils #  12.2  H


 


Lymphocytes #  0.7  L


 


Monocytes #  0.8  


 


Eosinophils #  0.0  


 


Basophils #  0.0  


 


Nucleated Red Blood Cells #  0.0  


 


Sodium Level  138  


 


Potassium Level  4.7  


 


Chloride Level  111  H


 


Carbon Dioxide Level  16  L


 


Anion Gap  16  


 


Blood Urea Nitrogen  37  #H


 


Creatinine  1.75  H


 


Glucose Level  109  


 


Calcium Level  8.1  L


 


Phosphorus Level  5.0  H


 


Magnesium Level  2.3  


 


Carcinoembryonic Antigen  4.8  


 


CA 19-9 Antigen  8.5  








Medications





 Current Medications


Ciprofloxacin/ Dextrose 200 ml @  200 mls/hr Q24H IVPB  Last administered on 5/ 18/17at 17:50; Admin Dose 200 MLS/HR;  Start 5/17/17 at 18:30


Metronidazole (Flagyl 500 Mg (Pmx)) 100 ml @  100 mls/hr Q8 IVPB  Last 

administered on 5/20/17at 05:22; Admin Dose 100 MLS/HR;  Start 5/17/17 at 22:00


Morphine Sulfate (morphine) 2 mg Q4H  PRN IV PAIN Last administered on 5/20/ 17at 09:45; Admin Dose 2 MG;  Start 5/17/17 at 18:30


Famotidine (Pepcid) 20 mg Q24H PO  Last administered on 5/20/17at 01:01; Admin 

Dose 20 MG;  Start 5/17/17 at 21:00


Docusate Sodium (Colace) 100 mg BID PO  Last administered on 5/20/17at 08:49; 

Admin Dose 100 MG;  Start 5/17/17 at 21:00


Miscellaneous Information Patients own medicat... BID@10,16 XX ;  Start 5/18/17 

at 10:00


Sodium Chloride (NS) 1,000 ml @  75 mls/hr [id number] IV  Last administered on 5/19 /17at 23:12; Admin Dose 75 MLS/HR;  Start 5/19/17 at 09:30


Salmeterol Xinafoate/ Fluticasone 1 inh 1 inh BID INH  Last administered on 5/19 /17at 14:22; Admin Dose 1 INH;  Start 5/19/17 at 13:00


Ferric Sodium Gluconate Complex/ Sodium Chloride (Ferrlecit/NS) 110 ml @  100 

mls/hr Q24H IVPB  Last administered on 5/19/17at 15:41; Admin Dose 100 MLS/HR;  

Start 5/19/17 at 14:00;  Stop 5/21/17 at 15:05





Assessment/Plan


Chief Complaint/Hosp Course


 


1.  Nonoliguric acute kidney injury on top of chronic kidney disease stage IV 

with a unilateral functioning kidney.  Etiology of current acute kidney injury 

appears to be hemodynamics, possible sepsis.  The patient's initial urinalysis 

is bland.  Nonactive sediment. nonoliguric, less than 1 gm proteinuria, and low 

FENa.  Patient's imaging study shows an atrophic right kidney and normal left 

kidney.  continue current treatment plan.  Continue IV fluids, continue 

antibiotics, avoid hemodynamic fluctuations.  Will otherwise continue 

supportive care, renally dose meds, avoid nephrotoxins, monitor closely with 

you.


2.  Chronic kidney disease stage IV with unilateral functioning kidney.  The 

patient has atrophic right kidney, which appears to have been present for 

several years.  Unclear if this is congenital or due to obstructive 

ureterolithiasis.  Plan at this point is to continue treating acute kidney 

injury as stated above, will check renal ultrasound and monitor closely.


3.  Mineral bone disorder.  The patient is hypophosphatemic secondary to acute 

kidney injury and chronic kidney disease.  Continue to monitor calcium and 

phosphorus levels.


4.  Mild hyponatremia.  Continue to monitor sodium levels closely. Limit free 

water intake.


5.  Hypermagnesemia secondary to acute kidney injury.  We will continue to 

monitor.


6.  Anemia.  Continue to monitor hemoglobin and hematocrit levels.


7.  Acute cholecystitis with possible underlying choledocholithiasis.  The 

patient is currently on IV antibiotics.  GI consult and a general surgical 

consults pending.  We will continue.


8.  Leukocytosis, likely secondary to acute cholecystitis.  Possible sepsis.  

The patient is currently on broad spectrum antibiotics.  Cultures have been 

sent.  Continue to monitor.  


9.  Gastrointestinal and deep venous thrombosis prophylaxis.


Problems:  











ROBIN TROTTER MD May 20, 2017 10:36

## 2017-05-20 NOTE — PN
Date/Time of Note


Date/Time of Note


DATE: 5/20/17 


TIME: 14:24





Assessment/Plan


Lines/Catheters


IV Catheter Type (from Nrs):  Peripheral IV


Liu in Place (from Nrs):  No





Assessment/Plan


Assessment/Plan





[facility] Progress Note





Date of Service: 5/20/17





Today's Impression & Plan:





Overall stable appears improved from 2 days ago. Renal function a bit better.





With above assessment, I've recommended the following for today:





1. Cont aggressive medical management


2. Lap pa hopefully Monday





Thank you again for your great care of this very pleasant patient and wonderful 

family. If there are any questions, please feel free to call me at [phone].


 


TOTAL VISIT TIME: 20 minutes of which more than half was spent in the operating 

room, as well as coordination of care between multiple physicians and providers.





Disclaimer: Inadvertent spelling or grammatical errors are likely due to EHR/

dictation software use and do not reflect on the overall quality of patient 

care.





Updated Clinical Summary:





A very pleasant 62-year-old lady admitted through the [facility] Emergency Room on 05/17/2017 with evidence for possible acute 

cholecystitis as well as choledocholithiasis.


 


COMORBIDITIES:


1.  BMI 26.9.


2.  Smoking history.


3.  Renal insufficiency.


4.  History of known cholelithiasis.


5.  Status post appendectomy.


6.  Status post hysterectomy.





Subjective: 





No major events or complaints; still with abd pain with some radiation to R 

flank and back; seems under control with medications; some nausea but no v/d; 

no sob or cp; + flatus; - BM; - activity





Objective:





Vitals: See below





Exam: 





GENERAL: On exam, the patient was laying in bed and appeared to be comfortable 

and in no acute distress.





ABDOMEN: Soft, nontender on the left side and mild to moderately tender on the 

right upper quadrant, and nondistended. There are no peritoneal signs or 

guarding. 





SKIN: Skin appears to be pink and feels warm to touch.





NEUROLOGIC: Patient is awake, alert, and follows commands appropriately.





Exam/Review of Systems


Vital Signs


Vitals





 Vital Signs








  Date Time  Temp Pulse Resp B/P Pulse Ox O2 Delivery O2 Flow Rate FiO2


 


5/20/17 13:00  90 24 167/94 100 Nasal Cannula 2.0 


 


5/20/17 12:00 97.7       


 


5/20/17 08:01        32














 Intake and Output   


 


 5/19/17 5/19/17 5/20/17





 15:00 23:00 07:00


 


Intake Total  500 ml 2000 ml


 


Output Total  550 ml 700 ml


 


Balance  -50 ml 1300 ml











Results


Result Diagram:  


5/20/17 0440                                                                   

             5/20/17 0440














HASEEB ROBLES M.D. May 20, 2017 14:29

## 2017-05-20 NOTE — RADRPT
PROCEDURE:   X-ray fluoroscopy guidance

 

CLINICAL INDICATION:   Abdominal pain 

 

TECHNIQUE:   Fluoroscopic guidance was utilized for ERCP. 

 

COMPARISON:   None available 

 

FINDINGS:

 

Distended common bile duct is noted.  Balloon sweep was performed. 233.4 seconds of fluoroscopy time
 was utilized for the procedure.  25 images were obtained during the procedure in progress.  

 

IMPRESSION:

 

1.  X-ray fluoroscopic guidance, as above.

 

RPTAT: QQ

_____________________________________________ 

.James Villa MD, MD           Date    Time 

Electronically viewed and signed by .James Villa MD, MD on 05/20/2017 11:53 

 

D:  05/20/2017 11:53  T:  05/20/2017 11:53

.R/

## 2017-05-21 NOTE — PN
Date/Time of Note


Date/Time of Note


DATE: 5/21/17 


TIME: 15:42





Assessment/Plan


VTE Prophylaxis


VTE Prophylaxis Intervention:  SCD's





Lines/Catheters


IV Catheter Type (from [facility]):  Peripheral IV


Urinary Cath still in place:  No





Assessment/Plan


Chief Complaint/Hosp Course


1.  Symptomatic cholelithiasis with possible underlying choledocholithiasis.  

Continue pain control.  Continue empiric antibiotics.  The patient is being 

evaluated by gastroenterology and general surgery.  ERCP showed a markedly 

dilated common bile duct with no definitive stones or intraductal pathology 

suggestive of ampullary stricture.  Status post sphincterotomy brushings and 

placement of a 10 French 7 cm stent.





2.  Leukocytosis.  Most probably secondary to #1.  No evidence of any septic 

shock.  Continue empiric antibiotics.





3.  Nicotine use.  The patient is trying to quit nicotine use.  The patient 

refused a nicotine patch.





4.  Chronic kidney disease.  The patient verbalized that she has known history 

of chronic kidney disease and she was following a nephrologist in [facility].   Patient being followed by nephrology.





5.  Normocytic hypochromic anemia.  Etiology unclear,  most probably secondary 

to underlying chronic kidney disease.  Continue to monitor H&H closely.  Iron 

panel showing iron deficiency.  Continue iron supplements.





6.  COPD.  No evidence of exacerbation.  Continue inhaled bronchodilators.  





7.  Fluid, electrolytes and nutrition.  Currently NPO.  Continue on IV fluids.  





8.  Migraines.  Continue as needed triptan's.  Will start the patient on 

topiramate.





9.  Deep venous thrombosis prophylaxis.  Bilateral sequential compression 

devices.





10.  Gastrointestinal prophylaxis.  Histamine 2 receptor blockers.  


 


PLAN:  Continue pain control.  Continue IV hydration.  Continue antibiotics.  

Await surgical intervention.


 


Case discussed with Dr. Sin.


Problems:  





Subjective


24 Hr Interval Summary


Free Text/Dictation


Complains of headache.





Exam/Review of Systems


Vital Signs


Vitals





 Vital Signs








  Date Time  Temp Pulse Resp B/P Pulse Ox O2 Delivery O2 Flow Rate FiO2


 


5/21/17 13:45  86 18  98 Nasal Cannula 3.0 


 


5/21/17 07:38 98.6   113/63    


 


5/20/17 16:46        32














 Intake and Output   


 


 5/20/17 5/20/17 5/21/17





 15:00 23:00 07:00


 


Intake Total 140 ml 1610 ml 1680 ml


 


Output Total 650 ml 200 ml 2200 ml


 


Balance -510 ml 1410 ml -520 ml











Exam


GENERAL:  This is a 62-year-old female patient lying in bed in no apparent 

distress.


HEENT:  Head normocephalic and atraumatic.  Eyes:  Anicteric sclerae.  

Conjunctivae clear.


ENT:  Nasal septum is midline.  Oral mucosa is dry.


NECK:  Supple.  No JVD noticed.


RESPIRATORY:  Bilateral diminished breath sounds.  No use of accessory muscles 

of respiration.  Bilateral scattered rhonchi.


CARDIAC:  Regular rate and rhythm.  No murmurs heard.


ABDOMEN:  Soft.  Diffuse tenderness especially in the right upper quadrant.  No 

guarding.


GENITOURINARY:  Deferred.


EXTREMITIES:  No cyanosis, no clubbing, no edema.  Peripheral pulses palpable.


NEUROLOGIC:  The patient is awake, alert and oriented.  Cranial nerves are 

grossly intact.





Results


Result Diagram:  


5/21/17 1121                                                                   

             5/21/17 1030





Results 24 hrs





Laboratory Tests








Test


  5/21/17


10:30 5/21/17


11:21


 


Sodium Level 135   


 


Potassium Level 5.2  H 


 


Chloride Level 108   


 


Carbon Dioxide Level 20  L 


 


Anion Gap 12   


 


Blood Urea Nitrogen 22  #H 


 


Creatinine 1.31  H 


 


Glucose Level 116   


 


Calcium Level 8.4   


 


White Blood Count  15.8  H


 


Red Blood Count  3.42  L


 


Hemoglobin  9.6  L


 


Hematocrit  29.9  L


 


Mean Corpuscular Volume  87.4  


 


Mean Corpuscular Hemoglobin  28.1  L


 


Mean Corpuscular Hemoglobin


Concent 


  32.1  


 


 


Red Cell Distribution Width  14.8  H


 


Platelet Count  243  #


 


Mean Platelet Volume  9.6  


 


Neutrophils %  84.1  H


 


Lymphocytes %  7.5  L


 


Monocytes %  6.5  


 


Eosinophils %  0.6  


 


Basophils %  0.3  


 


Nucleated Red Blood Cells %  0.0  


 


Neutrophils #  13.3  H


 


Lymphocytes #  1.2  


 


Monocytes #  1.0  H


 


Eosinophils #  0.1  


 


Basophils #  0.0  


 


Nucleated Red Blood Cells #  0.0  











Medications


Medications





 Current Medications


Ciprofloxacin/ Dextrose 200 ml @  200 mls/hr Q24H IVPB  Last administered on 5/ 20/17at 18:47; Admin Dose 200 MLS/HR;  Start 5/17/17 at 18:30


Metronidazole (Flagyl 500 Mg (Pmx)) 100 ml @  100 mls/hr Q8 IVPB  Last 

administered on 5/21/17at 13:19; Admin Dose 100 MLS/HR;  Start 5/17/17 at 22:00


Morphine Sulfate (morphine) 2 mg Q4H  PRN IV PAIN Last administered on 5/21/ 17at 13:19; Admin Dose 2 MG;  Start 5/17/17 at 18:30


Famotidine (Pepcid) 20 mg Q24H PO  Last administered on 5/20/17at 21:44; Admin 

Dose 20 MG;  Start 5/17/17 at 21:00


Docusate Sodium (Colace) 100 mg BID PO  Last administered on 5/21/17at 08:15; 

Admin Dose 100 MG;  Start 5/17/17 at 21:00


Miscellaneous Information Patients own medicat... BID@10,16 XX ;  Start 5/18/17 

at 10:00


Sodium Chloride (NS) 1,000 ml @  75 mls/hr [id number] IV  Last administered on 5/20 /17at 18:47; Admin Dose 75 MLS/HR;  Start 5/19/17 at 09:30


Salmeterol Xinafoate/ Fluticasone (Advair 250/50 Diskus) 1 inh BID INH  Last 

administered on 5/21/17at 08:15; Admin Dose 1 INH;  Start 5/19/17 at 13:00


Sumatriptan Succinate (Imitrex) 50 mg DAILY  PRN PO FOR MIGRAINE.MRX1 IN 24HOUR 

Last administered on 5/21/17at 10:49; Admin Dose 50 MG;  Start 5/21/17 at 01:00


Topiramate (Topamax) 50 mg BID PO ;  Start 5/21/17 at 21:00











DAMASO CHAIREZ NP May 21, 2017 15:42

## 2017-05-21 NOTE — CONS
Date/Time of Note


Date/Time of Note


DATE: 5/21/17 


TIME: 10:02





Consult Date/Type/Reason


Admit Date/Time


May 17, 2017 at 18:05


Initial Consult Date


5/18/17


Type of Consultation:  neph


Ordering Provider:  MARIA DEL ROSARIO WISDOM


This is a 62-year-old female with a past medical history of CKD stage IV, 

baseline creatinine around 2 to 2.5 mg/dL, history of cholelithiasis, who 

presents to [facility] with complaints of abdominal pain.  

The patient states that she has been having ongoing abdominal pain, mid 

epigastric, for the last 2 days associated with nausea and vomiting.  Pain is 

aching, sharp, non-radiation.  The patient came to the emergency room for 

evaluation.  Upon arrival, the patient had a CT scan abdomen and pelvis which 

showed findings of distended gallbladder, thickened, possible cholecystitis as 

well as possible ischemic bowel.  The patient also noted to have atrophic right 

kidney with a normal sized left kidney.  The patient was placed on IV 

antibiotics, was given pain medications and admitted to medical/surgical for 

evaluation.  Please note, in the emergency room, the patient also had a CT 

angio with contrast which showed no evidence of mesenteric ischemia or 

possibility of choledocholithiasis.  In terms of the patient's renal history, 

the patient says she has underlying chronic kidney disease with a unilateral 

functioning kidney.  The patient states that she has 1 atrophic kidney.  The 

patient denies any recent NSAID use.  Denies any recent diuretic use, denies 

any recent antibiotic use.  The patient denies any recent rashes, hemoptysis, 

hemetemesis, hematochezia or any frothy urine.  The patient states she has 

underlying  CKD and believes her creatinine is around 2 to 2.5 mg/dL.  


 





seen by gi and sp egd


1.  Small 6 mm antral gastric ulceration, benign endoscopic appearance.


2.  Normal ampulla of Vater.


3.  Normal partially filled pancreatogram with exception of slight dilatation 

in the head of the pancreas.


4.  Markedly dilated common bile duct, we no definitive stones or intraductal 

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


5.  Post-placement of 10-Finnish 7 cm stent with excellent drainage.





continues good uo. now transferred to [facility].  poc reviewed with dr. hyatt.








PHYSICAL EXAMINATION:


VITAL SIGNS:  Blood pressure is 100/58, respiration 18, pulse 87, temperature 

98.5.


HEENT:  Head is normocephalic.  Pupils are reactive to light.


NECK:  Supple.


HEART:  Regular rate.


LUNGS:  Show diminished breath sounds at base, otherwise clear.


ABDOMEN:  Soft, positive tenderness to palpation, positive rebound, positive 

guarding.


EXTREMITIES:  Negative for clubbing, cyanosis, no edema.


MUSCULOSKELETAL:  No joint effusions.


NEUROLOGIC:  No focal deficits.





Objective





 Vital Signs








  Date Time  Temp Pulse Resp B/P Pulse Ox O2 Delivery O2 Flow Rate FiO2


 


5/21/17 08:08  68 22  98 Nasal Cannula 3.0 


 


5/21/17 07:38 98.6   113/63    


 


5/20/17 16:46        32














 Intake and Output   


 


 5/20/17 5/20/17 5/21/17





 15:00 23:00 07:00


 


Intake Total 140 ml 1610 ml 1680 ml


 


Output Total 650 ml 200 ml 2200 ml


 


Balance -510 ml 1410 ml -520 ml











Results/Medications


Result Diagram:  


5/20/17 0440 5/20/17 0440





Medications





 Current Medications


Ciprofloxacin/ Dextrose 200 ml @  200 mls/hr Q24H IVPB  Last administered on 5/ 20/17at 18:47; Admin Dose 200 MLS/HR;  Start 5/17/17 at 18:30


Metronidazole (Flagyl 500 Mg (Pmx)) 100 ml @  100 mls/hr Q8 IVPB  Last 

administered on 5/21/17at 05:16; Admin Dose 100 MLS/HR;  Start 5/17/17 at 22:00


Morphine Sulfate (morphine) 2 mg Q4H  PRN IV PAIN Last administered on 5/21/ 17at 05:21; Admin Dose 2 MG;  Start 5/17/17 at 18:30


Famotidine (Pepcid) 20 mg Q24H PO  Last administered on 5/20/17at 21:44; Admin 

Dose 20 MG;  Start 5/17/17 at 21:00


Docusate Sodium (Colace) 100 mg BID PO  Last administered on 5/21/17at 08:15; 

Admin Dose 100 MG;  Start 5/17/17 at 21:00


Miscellaneous Information Patients own medicat... BID@10,16 XX ;  Start 5/18/17 

at 10:00


Sodium Chloride (NS) 1,000 ml @  75 mls/hr [id number] IV  Last administered on 5/20 /17at 18:47; Admin Dose 75 MLS/HR;  Start 5/19/17 at 09:30


Salmeterol Xinafoate/ Fluticasone 1 inh 1 inh BID INH  Last administered on 5/21 /17at 08:15; Admin Dose 1 INH;  Start 5/19/17 at 13:00


Ferric Sodium Gluconate Complex/ Sodium Chloride (Ferrlecit/NS) 110 ml @  100 

mls/hr Q24H IVPB  Last administered on 5/20/17at 13:10; Admin Dose 100 MLS/HR;  

Start 5/19/17 at 14:00;  Stop 5/21/17 at 15:05


Sumatriptan Succinate (Imitrex) 50 mg DAILY  PRN PO FOR MIGRAINE.MRX1 IN 24HOUR 

Last administered on 5/21/17at 01:26; Admin Dose 50 MG;  Start 5/21/17 at 01:00





Assessment/Plan


Chief Complaint/Hosp Course


 


1.  Nonoliguric acute kidney injury on top of chronic kidney disease stage IV 

with a unilateral functioning kidney.  Etiology of current acute kidney injury 

appears to be hemodynamics, possible sepsis.  The patient's initial urinalysis 

is bland.  Nonactive sediment. nonoliguric, less than 1 gm proteinuria, and low 

FENa.  Patient's imaging study shows an atrophic right kidney and normal left 

kidney.  continue current treatment plan.  Continue IV fluids, continue 

antibiotics, avoid hemodynamic fluctuations.  Will otherwise continue 

supportive care, renally dose meds, avoid nephrotoxins, monitor closely with 

you.


2.  Chronic kidney disease stage IV with unilateral functioning kidney.  The 

patient has atrophic right kidney, which appears to have been present for 

several years.  Unclear if this is congenital or due to obstructive 

ureterolithiasis.  Plan at this point is to continue treating acute kidney 

injury as stated above, will check renal ultrasound and monitor closely.


3.  Mineral bone disorder.  The patient is hypophosphatemic secondary to acute 

kidney injury and chronic kidney disease.  Continue to monitor calcium and 

phosphorus levels.


4.  Mild hyponatremia.  Continue to monitor sodium levels closely. Limit free 

water intake.


5.  Hypermagnesemia secondary to acute kidney injury.  We will continue to 

monitor.


6.  Anemia.  Continue to monitor hemoglobin and hematocrit levels.


7.  Acute cholecystitis with possible underlying choledocholithiasis.  The 

patient is currently on IV antibiotics.  GI consult and a general surgical 

consults pending.  We will continue.


8.  Leukocytosis, likely secondary to acute cholecystitis.  Possible sepsis.  

The patient is currently on broad spectrum antibiotics.  Cultures have been 

sent.  Continue to monitor.  


9.  Gastrointestinal and deep venous thrombosis prophylaxis.


Problems:  











ROBIN TROTTER MD May 21, 2017 10:03

## 2017-05-21 NOTE — PN
Date/Time of Note


Date/Time of Note


DATE: 5/21/17 


TIME: 11:38





Assessment/Plan


VTE Prophylaxis


VTE Prophylaxis Intervention:  SCD's





Lines/Catheters


IV Catheter Type (from [facility]):  Peripheral IV


Urinary Cath still in place:  No





Assessment/Plan


Assessment/Plan


* Abdominal pain


                   Cholecystitis  with chololithiasis


                       ERCP 5/19/2017


                          Small 6 mm antral gastric ulceration, benign 

endoscopic appearance.


                          Normal ampulla of Vater.


                   .  Normal partially filled pancreatogram with exception of 

slight dilatation in the head of the pancreas.


                .  Markedly dilated common bile duct, we no definitive stones 

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 

and 


                       brushings.


                 Post-placement of 10-Hungarian 7 cm stent with excellent drainage


* Hx of COPD


* Chronic kidney disease





Plan


* awaiting biopsy,CEA,Ca19-9 results


* continue present management





Subjective


24 Hr Interval Summary


Free Text/Dictation


* Course reviewed with RN


* Patient seen and examined


* Complains of productive cough and on and off abdominal pain





Exam/Review of Systems


Vital Signs


Vitals





 Vital Signs








  Date Time  Temp Pulse Resp B/P Pulse Ox O2 Delivery O2 Flow Rate FiO2


 


5/21/17 08:08  68 22  98 Nasal Cannula 3.0 


 


5/21/17 07:38 98.6   113/63    


 


5/20/17 16:46        32














 Intake and Output   


 


 5/20/17 5/20/17 5/21/17





 15:00 23:00 07:00


 


Intake Total 140 ml 1610 ml 1680 ml


 


Output Total 650 ml 200 ml 2200 ml


 


Balance -510 ml 1410 ml -520 ml











Exam


Constitutional:  alert, frail


Head:  atraumatic, normocephalic


Eyes:  nl conjunctiva, nl sclera


Neck:  non-tender, supple


Respiratory:  congested cough, crackles/rales, diminished breath sounds


Cardiovascular:  nl pulses, regular rate and rhythm


Gastrointestinal:  soft, tender (right upper quadrant)


Musculoskeletal:  nl extremities to inspection


Extremities:  normal pulses





Results


Result Diagram:  


5/21/17 1121                                                                   

             5/20/17 0440





Results 24 hrs





Laboratory Tests








Test


  5/21/17


11:21


 


White Blood Count 15.8  H


 


Red Blood Count 3.42  L


 


Hemoglobin 9.6  L


 


Hematocrit 29.9  L


 


Mean Corpuscular Volume 87.4  


 


Mean Corpuscular Hemoglobin 28.1  L


 


Mean Corpuscular Hemoglobin


Concent 32.1  


 


 


Red Cell Distribution Width 14.8  H


 


Platelet Count 243  #


 


Mean Platelet Volume 9.6  


 


Neutrophils % 84.1  H


 


Lymphocytes % 7.5  L


 


Monocytes % 6.5  


 


Eosinophils % 0.6  


 


Basophils % 0.3  


 


Nucleated Red Blood Cells % 0.0  


 


Neutrophils # 13.3  H


 


Lymphocytes # 1.2  


 


Monocytes # 1.0  H


 


Eosinophils # 0.1  


 


Basophils # 0.0  


 


Nucleated Red Blood Cells # 0.0  











Medications


Medications





 Current Medications


Ciprofloxacin/ Dextrose 200 ml @  200 mls/hr Q24H IVPB  Last administered on 5/ 20/17at 18:47; Admin Dose 200 MLS/HR;  Start 5/17/17 at 18:30


Metronidazole (Flagyl 500 Mg (Pmx)) 100 ml @  100 mls/hr Q8 IVPB  Last 

administered on 5/21/17at 05:16; Admin Dose 100 MLS/HR;  Start 5/17/17 at 22:00


Morphine Sulfate (morphine) 2 mg Q4H  PRN IV PAIN Last administered on 5/21/ 17at 05:21; Admin Dose 2 MG;  Start 5/17/17 at 18:30


Famotidine (Pepcid) 20 mg Q24H PO  Last administered on 5/20/17at 21:44; Admin 

Dose 20 MG;  Start 5/17/17 at 21:00


Docusate Sodium (Colace) 100 mg BID PO  Last administered on 5/21/17at 08:15; 

Admin Dose 100 MG;  Start 5/17/17 at 21:00


Miscellaneous Information Patients own medicat... BID@10,16 XX ;  Start 5/18/17 

at 10:00


Sodium Chloride (NS) 1,000 ml @  75 mls/hr [id number] IV  Last administered on 5/20 /17at 18:47; Admin Dose 75 MLS/HR;  Start 5/19/17 at 09:30


Salmeterol Xinafoate/ Fluticasone 1 inh 1 inh BID INH  Last administered on 5/21 /17at 08:15; Admin Dose 1 INH;  Start 5/19/17 at 13:00


Ferric Sodium Gluconate Complex/ Sodium Chloride (Ferrlecit/NS) 110 ml @  100 

mls/hr Q24H IVPB  Last administered on 5/20/17at 13:10; Admin Dose 100 MLS/HR;  

Start 5/19/17 at 14:00;  Stop 5/21/17 at 15:05


Sumatriptan Succinate (Imitrex) 50 mg DAILY  PRN PO FOR MIGRAINE.MRX1 IN 24HOUR 

Last administered on 5/21/17at 10:49; Admin Dose 50 MG;  Start 5/21/17 at 01:00











VICK SNELL MD May 21, 2017 11:41

## 2017-05-22 NOTE — PN
Date/Time of Note


Date/Time of Note


DATE: 5/22/17 


TIME: 12:17





Assessment/Plan


VTE Prophylaxis


VTE Prophylaxis Intervention:  SCD's





Lines/Catheters


IV Catheter Type (from [facility]):  Peripheral IV


Urinary Cath still in place:  No





Assessment/Plan


Chief Complaint/Hosp Course


1.  Symptomatic cholelithiasis with possible underlying choledocholithiasis.  

Continue pain control.  Continue empiric antibiotics.  The patient is being 

evaluated by gastroenterology and general surgery.  ERCP showed a markedly 

dilated common bile duct with no definitive stones or intraductal pathology 

suggestive of ampullary stricture.  Status post sphincterotomy brushings and 

placement of a 10 French 7 cm stent.  Status post cholecystectomy on 5/22/2017.





2.  Leukocytosis.  Most probably secondary to #1.  No evidence of any septic 

shock.  Continue empiric antibiotics.





3.  Nicotine use.  The patient is trying to quit nicotine use.  The patient 

refused a nicotine patch.





4.  Chronic kidney disease.  The patient verbalized that she has known history 

of chronic kidney disease and she was following a nephrologist in [facility].   Patient being followed by nephrology.





5.  Normocytic hypochromic anemia.  Etiology unclear,  most probably secondary 

to underlying chronic kidney disease.  Continue to monitor H&H closely.  Iron 

panel showing iron deficiency.  Continue iron supplements.





6.  COPD.  No evidence of exacerbation.  Continue inhaled bronchodilators.  





7.  Fluid, electrolytes and nutrition.  Resume diet as per surgery.  Continue 

on IV fluids.  





8.  Migraines.  Continue as needed triptan's.  Continue topiramate.





9.  Deep venous thrombosis prophylaxis.  Bilateral sequential compression 

devices.





10.  Gastrointestinal prophylaxis.  Histamine 2 receptor blockers.  


 


PLAN:  Continue pain control.  Continue IV hydration.  Continue antibiotics.  

Advance diet as per surgery.


 


Case discussed with Dr. Mcbride.


Problems:  





Subjective


24 Hr Interval Summary


Free Text/Dictation


Status post laparoscopic cholecystectomy today.





Exam/Review of Systems


Vital Signs


Vitals





 Vital Signs








  Date Time  Temp Pulse Resp B/P Pulse Ox O2 Delivery O2 Flow Rate FiO2


 


5/22/17 11:45  79 18 91/51 96 Nasal Cannula 2.0 


 


5/22/17 11:30 98.0       


 


5/20/17 16:46        32














 Intake and Output   


 


 5/21/17 5/21/17 5/22/17





 15:00 23:00 07:00


 


Intake Total  1705 ml 1000 ml


 


Output Total  1050 ml 1200 ml


 


Balance  655 ml -200 ml











Exam


GENERAL:  This is a 62-year-old female patient lying in bed in no apparent 

distress.


HEENT:  Head normocephalic and atraumatic.  Eyes:  Anicteric sclerae.  

Conjunctivae clear.


ENT:  Nasal septum is midline.  Oral mucosa is dry.


NECK:  Supple.  No JVD noticed.


RESPIRATORY:  Bilateral diminished breath sounds.  No use of accessory muscles 

of respiration.  Bilateral scattered rhonchi.


CARDIAC:  Regular rate and rhythm.  No murmurs heard.


ABDOMEN:  Soft.  Diffuse tenderness especially in the right upper quadrant.  No 

guarding.  Laparoscopic incision sites clean, dry, and intact.  KARIN drain in 

place.


GENITOURINARY:  Deferred.


EXTREMITIES:  No cyanosis, no clubbing, no edema.  Peripheral pulses palpable.


NEUROLOGIC:  The patient is awake, alert and oriented.  Cranial nerves are 

grossly intact.





Results


Result Diagram:  


5/22/17 0520                                                                   

             5/22/17 0520





Results 24 hrs





Laboratory Tests








Test


  5/22/17


05:20


 


White Blood Count 16.5  H


 


Red Blood Count 3.38  L


 


Hemoglobin 9.2  L


 


Hematocrit 29.5  L


 


Mean Corpuscular Volume 87.3  


 


Mean Corpuscular Hemoglobin 27.2  L


 


Mean Corpuscular Hemoglobin


Concent 31.2  L


 


 


Red Cell Distribution Width 14.7  H


 


Platelet Count 250  


 


Mean Platelet Volume 9.7  


 


Neutrophils % 81.5  H


 


Lymphocytes % 9.4  L


 


Monocytes % 6.5  


 


Eosinophils % 0.7  


 


Basophils % 0.2  


 


Nucleated Red Blood Cells % 0.0  


 


Neutrophils # 13.4  H


 


Lymphocytes # 1.6  


 


Monocytes # 1.1  H


 


Eosinophils # 0.1  


 


Basophils # 0.0  


 


Nucleated Red Blood Cells # 0.0  


 


Prothrombin Time 18.0  H


 


Prothrombin Time Ratio 1.4  


 


INR International Normalized


Ratio 1.48  


 


 


Activated Partial


Thromboplast Time 42.0  H


 


 


Sodium Level 136  


 


Potassium Level 4.0  


 


Chloride Level 105  


 


Carbon Dioxide Level 21  


 


Anion Gap 14  


 


Blood Urea Nitrogen 17  


 


Creatinine 1.23  H


 


Glucose Level 106  


 


Calcium Level 8.2  L


 


Phosphorus Level 3.2  


 


Magnesium Level 1.6  L


 


Total Bilirubin 0.0  L


 


Direct Bilirubin 0.00  


 


Indirect Bilirubin 0.0  


 


Aspartate Amino Transf


(AST/SGOT) 11  L


 


 


Alanine Aminotransferase


(ALT/SGPT) 24  


 


 


Alkaline Phosphatase 205  H


 


Total Protein 5.6  L


 


Albumin 2.4  L


 


Globulin 3.20  


 


Albumin/Globulin Ratio 0.75  











Medications


Medications





 Current Medications


Ciprofloxacin/ Dextrose 200 ml @  200 mls/hr Q24H IVPB  Last administered on 5/ 21/17at 17:46; Admin Dose 200 MLS/HR;  Start 5/17/17 at 18:30


Metronidazole (Flagyl 500 Mg (Pmx)) 100 ml @  100 mls/hr Q8 IVPB  Last 

administered on 5/22/17at 06:00; Admin Dose 100 MLS/HR;  Start 5/17/17 at 22:00


Morphine Sulfate (morphine) 2 mg Q4H  PRN IV PAIN Last administered on 5/21/ 17at 20:15; Admin Dose 2 MG;  Start 5/17/17 at 18:30


Famotidine (Pepcid) 20 mg Q24H PO  Last administered on 5/21/17at 20:14; Admin 

Dose 20 MG;  Start 5/17/17 at 21:00


Docusate Sodium (Colace) 100 mg BID PO  Last administered on 5/21/17at 20:14; 

Admin Dose 100 MG;  Start 5/17/17 at 21:00


Miscellaneous Information Patients own medicat... BID@10,16 XX ;  Start 5/18/17 

at 10:00


Sodium Chloride (NS) 1,000 ml @  75 mls/hr [id number] IV  Last administered on 5/21 /17at 17:45; Admin Dose 75 MLS/HR;  Start 5/19/17 at 09:30


Salmeterol Xinafoate/ Fluticasone (Advair 250/50 Diskus) 1 inh BID INH  Last 

administered on 5/21/17at 20:18; Admin Dose 1 INH;  Start 5/19/17 at 13:00


Sumatriptan Succinate (Imitrex) 50 mg DAILY  PRN PO FOR MIGRAINE.MRX1 IN 24HOUR 

Last administered on 5/21/17at 10:49; Admin Dose 50 MG;  Start 5/21/17 at 01:00


Topiramate 50 mg 50 mg BID PO  Last administered on 5/21/17at 20:14; Admin Dose 

50 MG;  Start 5/21/17 at 21:00


Potassium Chloride/Dextrose/ Sod Cl (D5-1/2ns + KCl 20 Meq) 1,000 ml @  100 mls/

hr Q10H IV  Last administered on 5/22/17at 11:52; Admin Dose 100 MLS/HR;  Start 

5/22/17 at 10:10


Acetaminophen/ Hydrocodone Bitart (Norco (5/325)) 1 tab Q4H  PRN PO PAIN LEVEL 4

-7;  Start 5/22/17 at 10:30


Acetaminophen/ Hydrocodone Bitart (Norco (5/325)) 2 tab Q4H  PRN PO PAIN LEVEL 7

-10;  Start 5/22/17 at 10:30


Hydromorphone HCl (Dilaudid) 0.5 mg Q2H  PRN IV PAIN;  Start 5/22/17 at 10:30


Hydromorphone HCl (Dilaudid) 1 mg Q2  PRN IV PAIN;  Start 5/22/17 at 10:30


Docusate Sodium (Colace) 100 mg BID  PRN PO CONSTIPATION;  Start 5/22/17 at 10:

30


Bisacodyl (Dulcolax Supp) 10 mg BID  PRN SD CONSTIPATION;  Start 5/22/17 at 10:

30


Sodium Biphosphate/ Sodium Phosphate (Fleet Enema) 133 ml BID  PRN SD 

CONSTIPATION;  Start 5/22/17 at 10:30











DAMASO CHAIREZ NP May 22, 2017 12:18

## 2017-05-22 NOTE — HPN
Date/Time of Note


Date/Time of Note


DATE: 5/22/17 


TIME: 07:38





Interval H&P Admission Note


Pt. seen H&P reviewed:  No system changes


Pt. seen H&P reviewed. No system changes (I attest that I have seen and 

examined the patient and reviewed the operation in detail, as well as its risks

, benefits and alternatives of the operation).





I attest that I have seen and examined the patient and reviewed in detail the 

operation, and its associated risks, benefits and alternative. I have answered 

all the patient's questions to the best of my ability and the patient wishes to 

proceed.





Please refer to rest of electronic medical record for additional updates.











HASEEB ROBLES M.D. May 22, 2017 07:38

## 2017-05-22 NOTE — OPR
Date/Time of Note


Date/Time of Note


DATE: 5/22/17 


TIME: 10:36





Operative Report


Operative\Procedure Findings





[facility] INPATIENT OPERATIVE NOTE





PLACE OF SERVICE: [facility]





DATE OF SURGERY: 5/22/2017





PREOPERATIVE DIAGNOSIS:


1.  Acute cholecystitis


2.  Smoking history.


3.  Renal insufficiency.


4.  History of known cholelithiasis.


5.  Status post appendectomy.


6.  Status post hysterectomy.


7.  Status post ERCP with sphincterotomy and stenting at [facility] on 5/21/2017 (no 

obvious malignancy, no significant stone disease, thought to be benign stricture

).


8.  BMI 26.9.





POSTOPERATIVE DIAGNOSIS:


1.  Severe acute on chronic cholecystitis with gangrene and perforation


2.  Smoking history.


3.  Renal insufficiency.


4.  History of known cholelithiasis.


5.  Status post appendectomy.


6.  Status post hysterectomy.


7.  Status post ERCP with sphincterotomy and stenting at [facility] on 5/21/2017 (no 

obvious malignancy, no significant stone disease, thought to be benign stricture

).


8.  BMI 26.9.





OPERATION:


1. Laparoscopic cholecystectomy (modifier 22)





SURGEON: Haseeb Robles M.D.





ASSISTANT:


None





ANESTHESIA: General endotracheal tube anesthesia





ANESTHESIOLOGIST: LUCHO Jaffe M.D.





BRIEF SUMMARY: An otherwise uncomplicated but challenging laparoscopic 

cholecystectomy was performed with findings of severe acute on chronic 

cholecystitis with gangrene and perforation.





A very pleasant 62-year-old lady admitted through the [facility] Emergency Room on 05/17/2017 with evidence for possible acute 

cholecystitis as well as choledocholithiasis.  Status post ERCP with 

sphincterotomy and stenting on 5/21/2017 (no obvious malignancy, no significant 

stone disease, thought to be benign stricture).


 


COMORBIDITIES:


1.  BMI 26.9.


2.  Smoking history.


3.  Renal insufficiency.


4.  History of known cholelithiasis.


5.  Status post appendectomy.


6.  Status post hysterectomy.


7.  Status post ERCP with sphincterotomy and stenting at [facility] on 5/21/2017 (no 

obvious malignancy, no significant stone disease, thought to be benign stricture

).





BRIEF HISTORY: The patient is a very pleasant 62-year-old lady with above-

mentioned history and comorbidities who was diagnosed as likely having acute 

cholecystitis as described above. I met with the patient (and spoke with patient

's son over the phone) and counseled them regarding the possible options of 

treatment, and I strongly suggested a laparoscopic, possible open 

cholecystectomy. We reviewed the operation in detail as well as the risks, 

benefits, alternatives, and expected outcomes of this operation. After careful 

consideration of all the risks, benefits, and alternatives, the patient and 

family appeared to understand those risks and wished to proceed with surgery. 

For a detailed report of my consultation with patient and family, please refer 

to my separate consultation note.





STATEMENT OF THE INFORMED CONSENT: The patient and family appeared to 

understand the risks of the operation to include, but not be limited to risk of 

postoperative pain and scar tissue, possible infection or bleeding requiring 

other interventions such as opening the wound, placement of drainage catheters, 

or other operative interventions; possible injury to surrounding to structures 

including bowel, bladder, bile duct, or blood vessels, or solid organs such as 

liver, kidney, or pancreas requiring other interventions or procedures; 

possible leakage of bile from surgical clip sites, suture lines, or worse, from 

common bile duct injury, causing significant increase in morbidity and 

mortality and requiring multiple interventions including but not limited to, 

placement of drainage catheters, imaging studies, as well as operative 

interventions; possible other source of sepsis such as urinary tract infections 

or pneumonias, or other sources of potentially life threatening problems such 

as deep venous thrombus formation causing pulmonary embolism, myocardial 

arrhythmias and infarctions, and even death.





After careful consideration of all their options, the patient and family 

appeared to understand and wished to proceed with surgery.





DESCRIPTION OF PROCEDURE: After obtaining informed consent, the patient was 

brought into the operating room and was placed in a normal supine position, 

where successful general endotracheal tube anesthesia was performed. The patient

's abdominal skin was prepped and draped, from the nipple line down to the 

level of the groins, in the usual sterile fashion. Intravenous access was 

already in place, and appropriately chosen and dosed prophylactic intravenous 

antimicrobials were administered. We then called a surgical time-out where 

patient's identification, date of birth, nature of the operation, allergies, 

presence of intravenous antimicrobials, presence of needed equipment, and any 

other concerns were reviewed and agreed upon by all members of the operating 

room team.


 


We then started the operation by placing a 5-mm skin incision in the right-

upper quadrant, subcostal midclavicular line, and introduced a 5-mm [employer] 

[employer] trocar into the peritoneal space, visualizing all the layers of the 

abdominal wall as we entered.  Note that the patient had a midline incision 

from subxiphoid down to immediately supraumbilical area from prior operation.  

Also please note that there was evidence for slight tear in the surface of the 

liver once we entered the peritoneal surface.  We insufflated the abdominal 

cavity to a maximum pressure of 15 mmHg, again, confirmed lack of any injury to 

underlying structures prior to visualizing the rest of the abdominal cavity. We 

found significant amount of adhesions intra-abdominally as expected from patient

's previous history.  We could not see the gallbladder and this area was 

completely covered with omentum.  There was also interesting to her 3 areas of 

bile tinged tissue without obvious bile leaking activity from anywhere 

indicative of perhaps recent perforation of gallbladder or bile leak. There was 

no evidence of malignancy. No evidence of calcifications.  The liver appeared 

to be healthy.





With this information, we went a head and placed the other trocars under direct 

visualization, after injecting their sites with 0.25% Marcaine with epinephrine

, placing a 5-mm trocar in the umbilical midline area, a 5-mm trocar in the 

right anterior axillary line, and a 12-mm trocar in the midline subxiphoid 

region. With our instruments in place, we had excellent visualization and 

access to the right-upper quadrant.


 


We immediately controlled the small 4 mm tear on the anterior surface of the 

right lobe of the liver segment 5 from the initial insertion point using 

cautery.  We then started a very meticulous process dissecting the area around 

the wall of the gallbladder using mainly blunt dissection as well as judicious 

use of cautery.  It became quickly obvious that the patient's gallbladder wall 

was necrotic and there was a perforation on the medial wall to the left of the 

gallbladder with bile coming out of this area once we started the dissection.  

We very carefully dissected the gallbladder top-down towards the fundus using 

meticulous dissection with cautery as well as with blunt force.  We were able 

to see the inside of the gallbladder and the fundus appeared to be healthy and 

viable.  A small stone was removed from inside of a fundus and no other small 

stones were found and the opening of the cystic duct was visualized.  As 

expected, there was significant amount of inflammation in the area of the 

fundus and the triangle of Calot.  After spending quite a bit of time using 

judicious dissection in this area, I concluded that further dissection to 

identify and isolate the cystic duct carried on acceptable risk of injuring 

surrounding important structures.  Since I was able to remove more than 95% of 

the body of the gallbladder with the available dissected area, I decided to 

perform a partial cholecystectomy using one firing of the [employer] End[employer] DAVID 

stapler (flex) using a vascular (white) load.  This went without any 

difficulty.  The right lateral corner of the staple line was reinforced with 

two 10 mm clips using a laparoscopic clip applier.  There was no evidence of 

any leakage of bile from the staple line and the staple line appeared to be 

orderly.





We then delivered the gallbladder out inside of an EndoCatch bag through the 12-

mm trocar site without enlarging the fascia or contaminating the wound. The 

gallbladder was sent to Pathology for evaluation.


 


Returning to the abdominal cavity, we insufflated the stomach with 150 cc of 

normal saline and very carefully observe the area of the duodenum and the first 

and second portion to make sure that there is no extravasation of saline.  We 

did not notice any enteric contents of bile coming out from this region.  Note 

that we also had the recent ERCP as well as the pictures to demonstrate lack of 

any obvious choledochoduodenal or colo-duodenal fistulas.  Once we were 

satisfied that we do not have a hole in the bowel, we ensured that there was 

adequate hemostasis and bile-stasis prior to removal of all of or equipment, 

including the Ray-Arline that we had used during the operation to lift the liver 

up and the pneumoperitoneum, and then left a 19 Tamazight Josh drain through the 

right upper quadrant midclavicular subcostal 5 mm port site and positioning the 

tail in the gallbladder fossa, and fixing the drain onto the skin using 2-0 

nylon suture prior to reapproximating the 12-mm trocar site with one figure-of-

eight 0 Vicryl suture, followed by washing the wounds with copious amounts of 

normal saline, and then reapproximating the skin using interrupted 4-0 Monocryl 

sutures. Light dressing was then applied.





At the end of the operation, both the sponge count and needle count were 

reportedly correct x2.





The patient tolerated the procedure without any reported complications.





Please note that this operation qualifies for modifier 22 given complexity of 

the decision-making as well as degree of difficulty of the case.





ESTIMATED BLOOD LOSS: 75 cc





BLOOD OR BLOOD PRODUCT TRANSFUSIONS: None to my knowledge.





SPECIMENS:


1. Gallbladder





COMPLICATIONS: None.





DISPOSITION: Recovery area.





Disclaimer: Inadvertent spelling and grammatical errors are likely due to EHR/

dictation software use and do not reflect on the quality of delivered patient 

care.











HASEEB ROBLES M.D. May 22, 2017 10:37

## 2017-05-22 NOTE — PN
DATE:  

 

 

SUBJECTIVE:  The patient is stable, no acute events overnight.  No fevers, chills, nausea, vomiting.
  The patient is pending surgery this morning.

 

OBJECTIVE:

VITAL SIGNS:  Blood pressure 147/69, respirations 20, pulse 86, temperature 98.4.

HEENT:  Head is normocephalic.

NECK:  Supple.

HEART:  Regular rate.

LUNGS:  Show diminished breath sounds at the base.  

ABDOMEN:  Soft.  Positive tenderness to palpation. 

EXTREMITIES:  Negative for clubbing, cyanosis, no edema.

DERMATOLOGIC:  No rashes.

MUSCULOSKELETAL:  No joint effusion.  

NEUROLOGIC:  No change in exam.  

 

MEDICATIONS:  Have been reviewed.

 

LABORATORY DATA:  Shows sodium 136, potassium 4.0, chloride is 105, BUN 17, creatinine is 1.23.  Whi
te count 16.5, hemoglobin 9.2, hematocrit 29.5, platelet count is 250.

 

ASSESSMENT AND PLAN:

1.  Nonoliguric acute kidney injury on top chronic kidney disease stage IV with a unilateral functio
chhaya kidney.  Etiology of acute kidney injury is secondary to hemodynamics.  Renal function has impr
ariel.  At this point, continue current treatment plan, supportive care, renally dose all medications
, avoid nephrotoxins.

2.  Chronic kidney disease with a unilateral functioning kidney.  The patient was born with an atrop
hic right kidney.  The patient's renal function has improved with supportive care and IV fluids.  We
 will continue current treatment plan.

3.  Mineral disease.  Continue to monitor calcium and phosphorus. 

4.  Hypomagnesemia.  Replete with magnesium sulfate.

5.  Mild hyponatremia, resolved.

6.  Anemia.  Continue to monitor hemoglobin and hematocrit levels.

7.  Acute cholecystitis.  The patient is pending laparoscopic cholecystectomy.

8.  Leukocytosis secondary to acute cholecystitis.  Continue to monitor.  Continue antibiotic therap
y.

9.  Deep venous thrombosis prophylaxis.  Continue proton pump inhibitor and sequential leg squeezers
.

 

 

Dictated By: EZRA CAMPUZANO/JOSE ANGEL

DD:    05/22/2017 09:04:16

DT:    05/22/2017 09:14:44

Conf#: [id number]

DID#:  [id number]

## 2017-05-22 NOTE — PN
Date/Time of Note


Date/Time of Note


DATE: 5/22/17 


TIME: 15:36





Assessment/Plan


VTE Prophylaxis


VTE Prophylaxis Intervention:  ambulation





Lines/Catheters


IV Catheter Type (from [facility]):  Peripheral IV


Urinary Cath still in place:  No





Assessment/Plan


Assessment/Plan


* Abdominal pain


                   Cholecystitis  with chololithiasis


                       Laparoscopic cholecystectomy with drain 5/22/2017


                       ERCP 5/19/2017


                          Small 6 mm antral gastric ulceration, benign 

endoscopic appearance.


                          Normal ampulla of Vater.


                   .  Normal partially filled pancreatogram with exception of 

slight dilatation in the head of the pancreas.


                .  Markedly dilated common bile duct, we no definitive stones 

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 

and 


                       brushings.


                 Post-placement of 10-Mongolian 7 cm stent with excellent drainage


* Hx of COPD


* Chronic kidney disease





Plan


* awaiting biopsy,CEA,Ca19-9 results


* continue present management





Subjective


24 Hr Interval Summary


Free Text/Dictation


* Course reviewed with RN


* patient seen and examined


* S/P cholecystectomy with drain 5/22/2017





Exam/Review of Systems


Vital Signs


Vitals





 Vital Signs








  Date Time  Temp Pulse Resp B/P Pulse Ox O2 Delivery O2 Flow Rate FiO2


 


5/22/17 13:13  74 18  95 Nasal Cannula 3.0 


 


5/22/17 12:30    99/54    


 


5/22/17 11:30 98.0       


 


5/20/17 16:46        32














 Intake and Output   


 


 5/21/17 5/21/17 5/22/17





 15:00 23:00 07:00


 


Intake Total  1705 ml 1000 ml


 


Output Total  1050 ml 1200 ml


 


Balance  655 ml -200 ml











Exam


Constitutional:  alert, oriented


Head:  atraumatic, normocephalic


Neck:  non-tender, supple


Respiratory:  clear to auscultation, normal air movement


Cardiovascular:  nl pulses, regular rate and rhythm


Gastrointestinal:  non-tender, soft


Musculoskeletal:  nl extremities to inspection, nl gait and stance


Extremities:  normal pulses





Results


Result Diagram:  


5/22/17 0520                                                                   

             5/22/17 0520





Results 24 hrs





Laboratory Tests








Test


  5/22/17


05:20


 


White Blood Count 16.5  H


 


Red Blood Count 3.38  L


 


Hemoglobin 9.2  L


 


Hematocrit 29.5  L


 


Mean Corpuscular Volume 87.3  


 


Mean Corpuscular Hemoglobin 27.2  L


 


Mean Corpuscular Hemoglobin


Concent 31.2  L


 


 


Red Cell Distribution Width 14.7  H


 


Platelet Count 250  


 


Mean Platelet Volume 9.7  


 


Neutrophils % 81.5  H


 


Lymphocytes % 9.4  L


 


Monocytes % 6.5  


 


Eosinophils % 0.7  


 


Basophils % 0.2  


 


Nucleated Red Blood Cells % 0.0  


 


Neutrophils # 13.4  H


 


Lymphocytes # 1.6  


 


Monocytes # 1.1  H


 


Eosinophils # 0.1  


 


Basophils # 0.0  


 


Nucleated Red Blood Cells # 0.0  


 


Prothrombin Time 18.0  H


 


Prothrombin Time Ratio 1.4  


 


INR International Normalized


Ratio 1.48  


 


 


Activated Partial


Thromboplast Time 42.0  H


 


 


Sodium Level 136  


 


Potassium Level 4.0  


 


Chloride Level 105  


 


Carbon Dioxide Level 21  


 


Anion Gap 14  


 


Blood Urea Nitrogen 17  


 


Creatinine 1.23  H


 


Glucose Level 106  


 


Calcium Level 8.2  L


 


Phosphorus Level 3.2  


 


Magnesium Level 1.6  L


 


Total Bilirubin 0.0  L


 


Direct Bilirubin 0.00  


 


Indirect Bilirubin 0.0  


 


Aspartate Amino Transf


(AST/SGOT) 11  L


 


 


Alanine Aminotransferase


(ALT/SGPT) 24  


 


 


Alkaline Phosphatase 205  H


 


Total Protein 5.6  L


 


Albumin 2.4  L


 


Globulin 3.20  


 


Albumin/Globulin Ratio 0.75  











Medications


Medications





 Current Medications


Ciprofloxacin/ Dextrose 200 ml @  200 mls/hr Q24H IVPB  Last administered on 5/ 21/17at 17:46; Admin Dose 200 MLS/HR;  Start 5/17/17 at 18:30


Metronidazole (Flagyl 500 Mg (Pmx)) 100 ml @  100 mls/hr Q8 IVPB  Last 

administered on 5/22/17at 13:48; Admin Dose 100 MLS/HR;  Start 5/17/17 at 22:00


Morphine Sulfate (morphine) 2 mg Q4H  PRN IV PAIN Last administered on 5/22/ 17at 15:27; Admin Dose 2 MG;  Start 5/17/17 at 18:30


Famotidine (Pepcid) 20 mg Q24H PO  Last administered on 5/21/17at 20:14; Admin 

Dose 20 MG;  Start 5/17/17 at 21:00


Docusate Sodium (Colace) 100 mg BID PO  Last administered on 5/21/17at 20:14; 

Admin Dose 100 MG;  Start 5/17/17 at 21:00


Miscellaneous Information Patients own medicat... BID@10,16 XX ;  Start 5/18/17 

at 10:00


Sodium Chloride (NS) 1,000 ml @  75 mls/hr [id number] IV  Last administered on 5/21 /17at 17:45; Admin Dose 75 MLS/HR;  Start 5/19/17 at 09:30


Salmeterol Xinafoate/ Fluticasone (Advair 250/50 Diskus) 1 inh BID INH  Last 

administered on 5/21/17at 20:18; Admin Dose 1 INH;  Start 5/19/17 at 13:00


Sumatriptan Succinate (Imitrex) 50 mg DAILY  PRN PO FOR MIGRAINE.MRX1 IN 24HOUR 

Last administered on 5/21/17at 10:49; Admin Dose 50 MG;  Start 5/21/17 at 01:00


Topiramate 50 mg 50 mg BID PO  Last administered on 5/21/17at 20:14; Admin Dose 

50 MG;  Start 5/21/17 at 21:00


Potassium Chloride/Dextrose/ Sod Cl (D5-1/2ns + KCl 20 Meq) 1,000 ml @  100 mls/

hr Q10H IV  Last administered on 5/22/17at 11:52; Admin Dose 100 MLS/HR;  Start 

5/22/17 at 10:10


Acetaminophen/ Hydrocodone Bitart (Norco (5/325)) 1 tab Q4H  PRN PO PAIN LEVEL 4

-7;  Start 5/22/17 at 10:30


Acetaminophen/ Hydrocodone Bitart (Norco (5/325)) 2 tab Q4H  PRN PO PAIN LEVEL 7

-10;  Start 5/22/17 at 10:30


Hydromorphone HCl (Dilaudid) 0.5 mg Q2H  PRN IV PAIN;  Start 5/22/17 at 10:30


Hydromorphone HCl (Dilaudid) 1 mg Q2  PRN IV PAIN;  Start 5/22/17 at 10:30


Docusate Sodium (Colace) 100 mg BID  PRN PO CONSTIPATION;  Start 5/22/17 at 10:

30


Bisacodyl (Dulcolax Supp) 10 mg BID  PRN PA CONSTIPATION;  Start 5/22/17 at 10:

30


Sodium Biphosphate/ Sodium Phosphate (Fleet Enema) 133 ml BID  PRN PA 

CONSTIPATION;  Start 5/22/17 at 10:30











VICK SNELL MD May 22, 2017 15:39

## 2017-05-23 NOTE — PN
Date/Time of Note


Date/Time of Note


DATE: 5/23/17 


TIME: 13:53





Assessment/Plan


VTE Prophylaxis


VTE Prophylaxis Intervention:  SCD's





Lines/Catheters


IV Catheter Type (from [facility]):  Peripheral IV


Urinary Cath still in place:  No





Assessment/Plan


* Abdominal pain


                   Cholecystitis  with chololithiasis


                       Laparoscopic cholecystectomy with drain 5/22/2017


                       ERCP 5/19/2017


                          Small 6 mm antral gastric ulceration, benign 

endoscopic appearance.


                          Normal ampulla of Vater.


                   .  Normal partially filled pancreatogram with exception of 

slight dilatation in the head of the pancreas.


                .  Markedly dilated common bile duct, we no definitive stones 

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 

and 


                       brushings.


                 Post-placement of 10-[place] 7 cm stent with excellent drainage


* Hx of COPD


* Chronic kidney disease





Plan


* pain control


* continue present management





Subjective


24 Hr Interval Summary


Free Text/Dictation


* Course reviewed with RN


* patient seen and examined


* on and off abdominal pain





Exam/Review of Systems


Vital Signs


Vitals





 Vital Signs








  Date Time  Temp Pulse Resp B/P Pulse Ox O2 Delivery O2 Flow Rate FiO2


 


5/23/17 10:53       2.0 


 


5/23/17 09:00     95   21


 


5/23/17 08:19 98.2 81 20 100/51    


 


5/23/17 08:15      Nasal Cannula  














 Intake and Output   


 


 5/22/17 5/22/17 5/23/17





 15:00 23:00 07:00


 


Intake Total 1350 ml 1480 ml 600 ml


 


Output Total 110 ml 1170 ml 15 ml


 


Balance 1240 ml 310 ml 585 ml











Exam


Constitutional:  alert, frail


Head:  atraumatic, normocephalic


Eyes:  nl conjunctiva, nl sclera


Neck:  non-tender, supple


Respiratory:  clear to auscultation, normal air movement


Cardiovascular:  nl pulses, regular rate and rhythm


Gastrointestinal:  non-tender, soft, 


   No rebound or guarding


Musculoskeletal:  nl extremities to inspection, nl gait and stance


Extremities:  normal pulses





Results


Result Diagram:  


5/23/17 0753                                                                   

             5/23/17 0753





Results 24 hrs





Laboratory Tests








Test


  5/23/17


07:23 5/23/17


07:53 5/23/17


09:27


 


Prothrombin Time 15.9  H  


 


Prothrombin Time Ratio 1.2    


 


INR International Normalized


Ratio 1.26  


  


  


 


 


Activated Partial


Thromboplast Time 32.6  


  


  


 


 


White Blood Count  15.9  H 


 


Red Blood Count  3.17  L 


 


Hemoglobin  9.0  L 


 


Hematocrit  28.4  L 


 


Mean Corpuscular Volume  89.6   


 


Mean Corpuscular Hemoglobin  28.4  L 


 


Mean Corpuscular Hemoglobin


Concent 


  31.7  L


  


 


 


Red Cell Distribution Width  14.9  H 


 


Platelet Count  297   


 


Mean Platelet Volume  9.8   


 


Neutrophils %  76.8   


 


Lymphocytes %  11.6  L 


 


Monocytes %  6.5   


 


Eosinophils %  1.8   


 


Basophils %  0.3   


 


Nucleated Red Blood Cells %  0.0   


 


Neutrophils #  12.3  H 


 


Lymphocytes #  1.8   


 


Monocytes #  1.0  H 


 


Eosinophils #  0.3   


 


Basophils #  0.0   


 


Nucleated Red Blood Cells #  0.0   


 


Sodium Level  134  L 


 


Potassium Level  5.1   


 


Chloride Level  107   


 


Carbon Dioxide Level  23   


 


Anion Gap  9  # 


 


Blood Urea Nitrogen  15   


 


Creatinine  1.32  H 


 


Glucose Level  125   


 


Lactic Acid Level  1.7   


 


Calcium Level  8.0  L 


 


Phosphorus Level  3.2   


 


Magnesium Level  1.8   


 


Total Bilirubin  0.0  L 


 


Direct Bilirubin  0.00   


 


Indirect Bilirubin  0.0   


 


Aspartate Amino Transf


(AST/SGOT) 


  37  


  


 


 


Alanine Aminotransferase


(ALT/SGPT) 


  35  


  


 


 


Alkaline Phosphatase  156  H 


 


B-Type Natriuretic Peptide  3400  H 


 


Total Protein  5.9  L 


 


Albumin  2.6  L 


 


Globulin  3.30  H 


 


Albumin/Globulin Ratio  0.78   


 


Lab Scanned Report   REFERENCE LAB  











Medications


Medications





 Current Medications


Ciprofloxacin/ Dextrose 200 ml @  200 mls/hr Q24H IVPB  Last administered on 5/ 22/17at 17:30; Admin Dose 200 MLS/HR;  Start 5/17/17 at 18:30


Metronidazole (Flagyl 500 Mg (Pmx)) 100 ml @  100 mls/hr Q8 IVPB  Last 

administered on 5/23/17at 06:22; Admin Dose 100 MLS/HR;  Start 5/17/17 at 22:00


Morphine Sulfate (morphine) 2 mg Q4H  PRN IV PAIN Last administered on 5/23/ 17at 04:22; Admin Dose 2 MG;  Start 5/17/17 at 18:30


Famotidine (Pepcid) 20 mg Q24H PO  Last administered on 5/22/17at 20:49; Admin 

Dose 20 MG;  Start 5/17/17 at 21:00


Docusate Sodium (Colace) 100 mg BID PO  Last administered on 5/23/17at 08:16; 

Admin Dose 100 MG;  Start 5/17/17 at 21:00


Miscellaneous Information Patients own medicat... BID@10,16 XX ;  Start 5/18/17 

at 10:00


Sodium Chloride (NS) 1,000 ml @  75 mls/hr [id number] IV  Last administered on 5/21 /17at 17:45; Admin Dose 75 MLS/HR;  Start 5/19/17 at 09:30


Salmeterol Xinafoate/ Fluticasone (Advair 250/50 Diskus) 1 inh BID INH  Last 

administered on 5/23/17at 08:17; Admin Dose 1 INH;  Start 5/19/17 at 13:00


Sumatriptan Succinate (Imitrex) 50 mg DAILY  PRN PO FOR MIGRAINE.MRX1 IN 24HOUR 

Last administered on 5/21/17at 10:49; Admin Dose 50 MG;  Start 5/21/17 at 01:00


Topiramate 50 mg 50 mg BID PO  Last administered on 5/23/17at 08:16; Admin Dose 

50 MG;  Start 5/21/17 at 21:00


Potassium Chloride/Dextrose/ Sod Cl (D5-1/2ns + KCl 20 Meq) 1,000 ml @  100 mls/

hr Q10H IV  Last administered on 5/23/17at 06:22; Admin Dose 100 MLS/HR;  Start 

5/22/17 at 10:10


Acetaminophen/ Hydrocodone Bitart (Norco (5/325)) 1 tab Q4H  PRN PO PAIN LEVEL 4

-7;  Start 5/22/17 at 10:30


Acetaminophen/ Hydrocodone Bitart (Norco (5/325)) 2 tab Q4H  PRN PO PAIN LEVEL 7

-10;  Start 5/22/17 at 10:30


Hydromorphone HCl (Dilaudid) 0.5 mg Q2H  PRN IV PAIN;  Start 5/22/17 at 10:30


Hydromorphone HCl (Dilaudid) 1 mg Q2  PRN IV PAIN Last administered on 5/23/ 17at 10:21; Admin Dose 1 MG;  Start 5/22/17 at 10:30


Docusate Sodium (Colace) 100 mg BID  PRN PO CONSTIPATION;  Start 5/22/17 at 10:

30


Bisacodyl (Dulcolax Supp) 10 mg BID  PRN PA CONSTIPATION;  Start 5/22/17 at 10:

30


Sodium Biphosphate/ Sodium Phosphate (Fleet Enema) 133 ml BID  PRN PA 

CONSTIPATION;  Start 5/22/17 at 10:30











VICK SNELL MD May 23, 2017 13:55

## 2017-05-23 NOTE — PN
Date/Time of Note


Date/Time of Note


DATE: 5/23/17 


TIME: 07:37





Assessment/Plan


VTE Prophylaxis


VTE Prophylaxis Intervention:  SCD's





Lines/Catheters


IV Catheter Type (from [facility]):  Peripheral IV


Urinary Cath still in place:  No





Assessment/Plan


Chief Complaint/Hosp Course


1.  Symptomatic cholelithiasis with possible underlying choledocholithiasis.  

Continue pain control.  Continue empiric antibiotics.  The patient is being 

evaluated by gastroenterology and general surgery.  ERCP showed a markedly 

dilated common bile duct with no definitive stones or intraductal pathology 

suggestive of ampullary stricture.  Status post sphincterotomy brushings and 

placement of a 10 French 7 cm stent.  Status post cholecystectomy on 5/22/2017.





2.  Leukocytosis.  Most probably secondary to #1.  No evidence of any septic 

shock.  Continue empiric antibiotics.





3.  Nicotine use.  The patient is trying to quit nicotine use.  The patient 

refused a nicotine patch.





4.  Chronic kidney disease.  The patient verbalized that she has known history 

of chronic kidney disease and she was following a nephrologist in [facility].   Patient being followed by nephrology.





5.  Normocytic hypochromic anemia.  Etiology unclear,  most probably secondary 

to underlying chronic kidney disease.  Continue to monitor H&H closely.  Iron 

panel showing iron deficiency.  Continue iron supplements.





6.  COPD.  No evidence of exacerbation.  Continue inhaled bronchodilators.  





7.  Fluid, electrolytes and nutrition.  Resume diet as per surgery.  Continue 

on IV fluids.  





8.  Migraines.  Continue as needed triptans.  Continue topiramate.





9.  Deep venous thrombosis prophylaxis.  Bilateral sequential compression 

devices.





10.  Gastrointestinal prophylaxis.  Histamine 2 receptor blockers.  


 


PLAN:  Continue pain control.  Continue IV hydration.  Continue antibiotics.  

Advance diet as per surgery.  Encourage frequent ambulation and frequent use of 

incentive spirometry.


 


Case discussed with Dr. Mcbride.


Problems:  





Subjective


24 Hr Interval Summary


Free Text/Dictation


Tolerating oral intake.





Exam/Review of Systems


Vital Signs


Vitals





 Vital Signs








  Date Time  Temp Pulse Resp B/P Pulse Ox O2 Delivery O2 Flow Rate FiO2


 


5/23/17 01:59       3.0 


 


5/22/17 20:00      Nasal Cannula  


 


5/22/17 19:58  75 18  100   


 


5/22/17 19:41 98.7   116/67    


 


5/20/17 16:46        32














 Intake and Output   


 


 5/22/17 5/22/17 5/23/17





 15:00 23:00 07:00


 


Intake Total 1350 ml 1480 ml 600 ml


 


Output Total 110 ml 1170 ml 15 ml


 


Balance 1240 ml 310 ml 585 ml











Exam


GENERAL:  This is a 62-year-old female patient lying in bed in no apparent 

distress.


HEENT:  Head normocephalic and atraumatic.  Eyes:  Anicteric sclerae.  

Conjunctivae clear.


ENT:  Nasal septum is midline.  Oral mucosa is dry.


NECK:  Supple.  No JVD noticed.


RESPIRATORY:  Bilateral diminished breath sounds.  No use of accessory muscles 

of respiration.  Bilateral scattered rhonchi.


CARDIAC:  Regular rate and rhythm.  No murmurs heard.


ABDOMEN:  Soft.  Diffuse tenderness especially in the right upper quadrant.  No 

guarding.  Laparoscopic incision sites clean, dry, and intact.  KARIN drain in 

place.


GENITOURINARY:  Deferred.


EXTREMITIES:  No cyanosis, no clubbing, no edema.  Peripheral pulses palpable.


NEUROLOGIC:  The patient is awake, alert and oriented.  Cranial nerves are 

grossly intact.





Results


Result Diagram:  


5/22/17 0520 5/22/17 0520








Medications


Medications





 Current Medications


Ciprofloxacin/ Dextrose 200 ml @  200 mls/hr Q24H IVPB  Last administered on 5/ 22/17at 17:30; Admin Dose 200 MLS/HR;  Start 5/17/17 at 18:30


Metronidazole (Flagyl 500 Mg (Pmx)) 100 ml @  100 mls/hr Q8 IVPB  Last 

administered on 5/23/17at 06:22; Admin Dose 100 MLS/HR;  Start 5/17/17 at 22:00


Morphine Sulfate (morphine) 2 mg Q4H  PRN IV PAIN Last administered on 5/23/ 17at 04:22; Admin Dose 2 MG;  Start 5/17/17 at 18:30


Famotidine (Pepcid) 20 mg Q24H PO  Last administered on 5/22/17at 20:49; Admin 

Dose 20 MG;  Start 5/17/17 at 21:00


Docusate Sodium (Colace) 100 mg BID PO  Last administered on 5/22/17at 20:49; 

Admin Dose 100 MG;  Start 5/17/17 at 21:00


Miscellaneous Information Patients own medicat... BID@10,16 XX ;  Start 5/18/17 

at 10:00


Sodium Chloride (NS) 1,000 ml @  75 mls/hr [id number] IV  Last administered on 5/21 /17at 17:45; Admin Dose 75 MLS/HR;  Start 5/19/17 at 09:30


Salmeterol Xinafoate/ Fluticasone (Advair 250/50 Diskus) 1 inh BID INH  Last 

administered on 5/22/17at 20:50; Admin Dose 1 INH;  Start 5/19/17 at 13:00


Sumatriptan Succinate (Imitrex) 50 mg DAILY  PRN PO FOR MIGRAINE.MRX1 IN 24HOUR 

Last administered on 5/21/17at 10:49; Admin Dose 50 MG;  Start 5/21/17 at 01:00


Topiramate 50 mg 50 mg BID PO  Last administered on 5/22/17at 20:49; Admin Dose 

50 MG;  Start 5/21/17 at 21:00


Potassium Chloride/Dextrose/ Sod Cl (D5-1/2ns + KCl 20 Meq) 1,000 ml @  100 mls/

hr Q10H IV  Last administered on 5/23/17at 06:22; Admin Dose 100 MLS/HR;  Start 

5/22/17 at 10:10


Acetaminophen/ Hydrocodone Bitart (Norco (5/325)) 1 tab Q4H  PRN PO PAIN LEVEL 4

-7;  Start 5/22/17 at 10:30


Acetaminophen/ Hydrocodone Bitart (Norco (5/325)) 2 tab Q4H  PRN PO PAIN LEVEL 7

-10;  Start 5/22/17 at 10:30


Hydromorphone HCl (Dilaudid) 0.5 mg Q2H  PRN IV PAIN;  Start 5/22/17 at 10:30


Hydromorphone HCl (Dilaudid) 1 mg Q2  PRN IV PAIN;  Start 5/22/17 at 10:30


Docusate Sodium (Colace) 100 mg BID  PRN PO CONSTIPATION;  Start 5/22/17 at 10:

30


Bisacodyl (Dulcolax Supp) 10 mg BID  PRN MD CONSTIPATION;  Start 5/22/17 at 10:

30


Sodium Biphosphate/ Sodium Phosphate (Fleet Enema) 133 ml BID  PRN MD 

CONSTIPATION;  Start 5/22/17 at 10:30











DAMASO CHAIREZ NP May 23, 2017 07:38

## 2017-05-24 NOTE — PN
Date/Time of Note


Date/Time of Note


DATE: 5/24/17 


TIME: 15:23





Assessment/Plan


Lines/Catheters


IV Catheter Type (from [facility]):  Peripheral IV


Liu in Place (from [facility]):  No





Assessment/Plan


Assessment/Plan





[facility] Progress Note





Date of Service: 5/24/17





Today's Impression & Plan:





Overall stable and continuing to improve after difficult but uncomplicated lap 

pa 5/22/17. No obvious evidence of major post operative complication. Drain 

output ss and no indication of bile leak or intestinal leak and d/c'd at 

bedside. CO2 rise and slight rise in WBC and alk phos is expected and I do not 

suspect major intra-abdominal issues, but given clinical picture, her cough, 

and operative findings, would like to keep her overnight for further 

observation.





With above assessment, I've recommended the following for today:





1. Cont regular diet


2. Increase activity


3. Increase ICS


4. Likely d/c home tomorrow


5. Diuresis as needed


6. Labs in am





Thank you again for your great care of this very pleasant patient and wonderful 

family. If there are any questions, please feel free to call me at [phone].


 


TOTAL VISIT TIME: 20 minutes of which more than half was spent in the operating 

room, as well as coordination of care between multiple physicians and providers.





Disclaimer: Inadvertent spelling or grammatical errors are likely due to EHR/

dictation software use and do not reflect on the overall quality of patient 

care.





Updated Clinical Summary:





A very pleasant 62-year-old lady admitted through the [facility] Emergency Room on 05/17/2017 with evidence for possible acute 

cholecystitis as well as choledocholithiasis. S/p difficult but uncomplicated 

lap pa 5/22/17. Drain d/c 5/24/17.


 


COMORBIDITIES:


1.  BMI 26.9.


2.  Smoking history.


3.  Renal insufficiency.


4.  History of known cholelithiasis.


5.  Status post appendectomy.


6.  Status post hysterectomy.


7.  S/p difficult but uncomplicated lap pa 5/22/17 for perforated gangrenous 

acute on chronic cholecystitis.





Subjective: 





No major events or complaints; no major abd pain and only from incisions; seems 

under control with medications; no n/v/d; no sob or cp; + flatus; - BM; minimal 

activity





Objective:





Vitals: See below





Exam: 





GENERAL: On exam, the patient was laying in bed and appeared to be comfortable 

and in no acute distress.





ABDOMEN: Soft, minimally tender to palpation around the incisions, and 

nondistended. Incision dressings d/c'd and incisions c/d/i w/o obvious 

underlying e/e/d/h. Drain output minimal and ss; drain d/c'd at bedside without 

any difficulty. There are no peritoneal signs or guarding. 





SKIN: Skin appears to be pink and feels warm to touch.





NEUROLOGIC: Patient is awake, alert, and follows commands appropriately. Does 

not appear confused





Exam/Review of Systems


Vital Signs


Vitals





 Vital Signs








  Date Time  Temp Pulse Resp B/P Pulse Ox O2 Delivery O2 Flow Rate FiO2


 


5/24/17 13:36  78 20  94 Nasal Cannula 2.0 28


 


5/24/17 07:30 98.6   117/55    














 Intake and Output   


 


 5/23/17 5/23/17 5/24/17





 15:00 23:00 07:00


 


Intake Total 800 ml 1940 ml 1050 ml


 


Output Total  1015 ml 10 ml


 


Balance 800 ml 925 ml 1040 ml











Results


Result Diagram:  


5/24/17 0815                                                                   

             5/24/17 0815














HASEEB ROBLES M.D. May 24, 2017 15:27

## 2017-05-24 NOTE — PN
DATE:  05/24/2017

 

 

SUBJECTIVE:  The patient is stable, no acute events overnight.  No fevers, chills, nausea or vomitin
g.  No shortness of breath.

 

OBJECTIVE:

VITAL SIGNS:  Blood pressure is 117/55, respirations 18, pulse 77, temperature 98.6.

HEENT:  Head is normocephalic.

NECK:  Supple.

HEART:  Regular rate.

LUNGS:  Show diminished breath sounds at the bases.

ABDOMEN:  Soft, nontender to palpation.  No rebound or guarding.

EXTREMITIES:  Negative for clubbing, cyanosis.  No edema.

DERMATOLOGIC:  No rashes.

MUSCULOSKELETAL:  No joint effusions.

NEUROLOGIC:  No change in exam.

 

MEDICATIONS:  The patient's medications have been reviewed. 

 

LABORATORY DATA:  Shows sodium 134, potassium 5.0, chloride 107, BUN 15, creatinine 1.32.  White cou
nt is 15.9, hemoglobin 9.0, hematocrit 28.4, platelet count is 297.

 

ASSESSMENT AND PLAN:

1.  Nonoliguric acute kidney injury on top of chronic kidney disease with a unilateral functioning k
idney.  Etiology of acute kidney injury secondary to hemodynamics.  Renal function has improved.  At
 this point, continue current treatment plan, supportive care, renally dose all meds.

2.  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  The patient's renal function appears
 to have stabilized.  We will continue current treatment plan, supportive care, renally dose all med
s.

3.  Hyponatremia.  Etiology secondary to hypertonic fluids.  We will discontinue half normal saline 
and monitor.

4.  Anemia.  Continue to monitor hemoglobin and hematocrit levels.

5.  Acute cholecystitis.  The patient is status post laparoscopic cholecystectomy.  Continue medical
 management.

6.  Gastrointestinal and deep venous thrombosis prophylaxis.  Continue to monitor.

 

 

Dictated By: EZRA DAHL DO

 

NR/JOSE ANGEL

DD:    05/24/2017 09:05:00

DT:    05/24/2017 11:44:03

Conf#: [id number]

DID#:  [id number]

## 2017-05-24 NOTE — DS
Date/Time of Note


Date/Time of Note


DATE: 5/24/17 


TIME: 11:01





Discharge Summary


Admission/Discharge Info


Admit Date/Time


May 17, 2017 at 18:05


Discharge Date/Time


May 24, 2017.


.


Final Diagnosis


1.  Severe acute cholecystitis associated with cholelithiasis and 

choledocholithiasis with gangrene and perforation status post laparoscopic 

cholecystectomy May 22, 2017 with drain in place to be removed prior to 

discharge.


2.  Acute on Chronic kidney disease: resolved


3.  Normocytic hypochromic anemia likely secondary to chronic kidney disease


4.  COPD without exacerbation #5 chronic intermittent migraines 


5.  Chronic tobacco use status post cessation counseling


6. Small benign gastric ulcer





.


Patient Condition:  Stable


Consults


General surgery: Dr Keven Thomas


Gastroenterology: Anni Smith


Nephrology: Cem Jessica





.


Hx of Present Illness


PRESENTING COMPLAINT: abd pain


HISTORY OF PRESENTING COMPLAINT: This is a pleasant 62-year-old female who 

presents to the ER today with abdominal pain.  Pain is located in the 

epigastric region that has been going on for the last 2 days.  Pain is 

associated with nausea and vomiting, and described as an aching and sharp pain.

  Patient does have a history of gallstones and she might have a history of 

renal problems.  Pain was relieved by medications given in the emergency room.  

Patient was worked up extensively by ER doctor, and findings are highly 

suggestive of cholelithiasis with choledocholithiasis.  She is being admitted 

for further workup and management as well as gastroenterology as well as 

surgical review.


Hospital Course


The patient was admitted, and as mentioned above consult to obtain with surgery

, GI, nephrology.  The patient was supported with anti-emetics and IV pain 

control. She underwent an ERCP May 19, 2017 that showed a small gastric 

ulceration that looked benign on endoscopy, a markedly dilated common bile duct 

without definitive stones or intraductal pathology suggestive of ampullary 

stricture and is a 7 cm stent was placed in the bile duct with excellent 

drainage.  There was a slight dilatation in the head of the pancreas.  Based on 

this findings, brushes were sent for pathology and tumor markers were also 

obtained.  For the tumor markers CA-19-9 came back normal as did the 

carcinoembryonic antigen.  


She was taken to the operative room by the surgeon May 22, 2017 and underwent a 

laparoscopic cholecystectomy.  





She was found to have gangrene and perforation and a drain was left in place 

which is to be removed today prior to discharge.  





Pathology findings on the distal common bile duct proved to be insufficient for 

evaluation around the gallbladder showed just an acute gangrenous and chronic 

cholecystitis without evidence of malignancy.  





Her renal function improved nicely while in the hospital in a state stable and 

her electrolytes have all been monitored and repleted as indicated.  She 

continues to do well and was currently awaiting a surgical clearance for 

discharge at this time.





The patient was also counseled on the need to quit tobacco use and this was 

reinforced daily while in-house.





Comorbidities were also aggressively managed as per Med records. 


Patient at this time has been evaluated and examined in detail and is assessed 

to be in stable condition and ready for discharge.


Home Meds


Active Scripts


Lactobacillus Acidoph/Bulgaricus* (Floranex*) 1 Each Tablet, 1 TAB.CHEW PO BID 

for 7 Days, TAB.CHEW


   Prov:MARIA DEL ROSARIO WISDOM M.         5/24/17


Metronidazole (Flagyl) 500 Mg Tab, 500 MG PO Q8 for 7 Days, TAB


   Prov:NINA WISDOMO M.         5/24/17


Ciprofloxacin Hcl* (Ciprofloxacin Hcl*) 500 Mg Tablet, 500 MG PO BID, #14 TAB


   Prov:NINA WISDOMO M.         5/24/17


Docusate Sodium* (Colace*) 250 Mg Capsule, 250 MG PO DAILY, #30 CAP


   Prov:MARIA DEL ROSARIO WISDOM M.         5/24/17


Hydrocodone/Acetaminophen (Norco 7.5-325 Tablet) 1 Each Tablet, 1 EACH PO Q6H, #

60 TAB


   Prov:MARIA DEL ROSARIO WISDOM .         5/24/17


Salmeterol Xinaf/Fluticasone* (Advair*) 250-50 Diskus Inhaler, 1 INH INH BID, #

1 VIAL 2 Refills


   Prov:MARIA DEL ROSARIO IWSDOM .         5/24/17


Reported Medications


Promethazine Hcl* (Phenergan*) 25 Mg Tablet, 25 MG PO Q6H Y for NAUSEA AND/OR 

VOMITING, TAB


   5/17/17


Omeprazole* (Omeprazole*) 20 Mg Capsule.dr, 20 MG PO DAILY, #30 CAP


   5/17/17


Simvastatin* (Zocor*) 40 Mg Tablet, 40 MG PO QHS, #30 TAB


   5/17/17


Follow-up Plan


The patient will follow up with the surgeon, as well as her primary care 

physician within the next 2 weeks to ensure continued resolution of symptoms.  

She is to return to ER if symptoms recur, she develops fever,  or she is unable 

to reach her PCP.


.


Primary Care Provider


Tae Lai


Time spent on discharge:   > 30 minutes


Pending Labs





Laboratory Tests








Test


  5/24/17


08:15


 


White Blood Count


  14.410^3/ul


(4.8-10.8)


 


Red Blood Count


  3.4110^6/ul


(4.20-5.40)


 


Hemoglobin


  9.5g/dl


(12.0-16.0)


 


Hematocrit


  29.7%


(37.0-47.0)


 


Mean Corpuscular Volume


  87.1fl


(82.0-101.0)


 


Mean Corpuscular Hemoglobin


  27.9pg


(29.0-33.0)


 


Mean Corpuscular Hemoglobin


Concent 32.0g/dl


(32.0-37.0)


 


Red Cell Distribution Width


  15.2%


(11.5-14.5)


 


Platelet Count


  51791^3/UL


(140-415)


 


Mean Platelet Volume


  10.0fl


(7.4-10.4)


 


Neutrophils %


  69.7%


(39.0-77.0)


 


Lymphocytes %


  16.4%


(15.0-51.0)


 


Monocytes %


  8.4%


(0.0-11.0)


 


Eosinophils % 2.3% (0.0-7.0) 


 


Basophils % 0.3% (0.0-2.0) 


 


Nucleated Red Blood Cells %


  0.1/100WBC


(0.0-0.0)


 


Neutrophils #


  10.010^3/ul


(1.6-7.5)


 


Lymphocytes #


  2.410^3/ul


(0.8-2.9)


 


Monocytes #


  1.210^3/ul


(0.3-0.9)


 


Eosinophils #


  0.310^3/ul


(0.0-0.5)


 


Basophils #


  0.010^3/ul


(0.0-0.1)


 


Nucleated Red Blood Cells #


  0.010^3/ul


(0.0-0.0)


 


Sodium Level


  134mmol/L


(135-144)


 


Potassium Level


  4.6mmol/L


(3.5-5.1)


 


Chloride Level


  106mmol/L


()


 


Carbon Dioxide Level


  20mmol/L


(21-31)


 


Anion Gap 13 (8-16) 


 


Blood Urea Nitrogen 13mg/dl (7-20) 


 


Creatinine


  1.20mg/dl


(0.44-1.00)


 


Glucose Level


  113mg/dl


()


 


Calcium Level


  8.4mg/dl


(8.4-10.2)


 


Phosphorus Level


  3.5mg/dl


(2.5-4.9)


 


Magnesium Level


  1.9mg/dl


(1.7-2.5)


 


Total Bilirubin


  0.0mg/dl


(0.2-1.3)


 


Direct Bilirubin


  0.00mg/dl


(0.00-0.20)


 


Indirect Bilirubin


  0.0mg/dl


(0-1.1)


 


Aspartate Amino Transf


(AST/SGOT) 28IU/L (15-46) 


 


 


Alanine Aminotransferase


(ALT/SGPT) 35IU/L (13-69) 


 


 


Alkaline Phosphatase


  160IU/L


()


 


Total Protein


  6.3g/dl


(6.1-8.1)


 


Albumin


  2.8g/dl


(3.3-4.9)


 


Globulin


  3.50g/dl


(1.3-3.2)


 


Albumin/Globulin Ratio 0.80 

















MARIA DEL ROSARIO WISDOM May 24, 2017 11:07

## 2017-05-24 NOTE — PN
Date/Time of Note


Date/Time of Note


DATE: 5/24/17 


TIME: 16:54





Assessment/Plan


VTE Prophylaxis


VTE Prophylaxis Intervention:  SCD's





Lines/Catheters


IV Catheter Type (from [facility]):  Peripheral IV


Urinary Cath still in place:  No





Assessment/Plan


Assessment/Plan


Abdominal pain


                   Cholecystitis  with chololithiasis


                       Laparoscopic cholecystectomy with drain 5/22/2017


                       ERCP 5/19/2017


                          Small 6 mm antral gastric ulceration, benign 

endoscopic appearance.


                          Normal ampulla of Vater.


                   .  Normal partially filled pancreatogram with exception of 

slight dilatation in the head of the pancreas.


                .  Markedly dilated common bile duct, we no definitive stones 

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 

and 


                       brushings.


                 Post-placement of 10-[place] 7 cm stent with excellent drainage


* Hx of COPD


* Chronic kidney disease





Plan


* Stable for outpatient management


* Follow up after 6-8 weeks to our clinic for evaluation and schedule for 

removal of ercp stent


* continue present management





Subjective


24 Hr Interval Summary


Free Text/Dictation


* Course reviewed with RN


* Patient seen and examined


* No abdominal pain





Exam/Review of Systems


Vital Signs


Vitals





 Vital Signs








  Date Time  Temp Pulse Resp B/P Pulse Ox O2 Delivery O2 Flow Rate FiO2


 


5/24/17 16:17       2.0 


 


5/24/17 13:36  78 20  94 Nasal Cannula  28


 


5/24/17 07:30 98.6   117/55    














 Intake and Output   


 


 5/23/17 5/23/17 5/24/17





 15:00 23:00 07:00


 


Intake Total 800 ml 1940 ml 1050 ml


 


Output Total  1015 ml 10 ml


 


Balance 800 ml 925 ml 1040 ml











Exam


Constitutional:  alert, oriented


Head:  normocephalic


Neck:  non-tender, supple


Respiratory:  clear to auscultation, normal air movement


Cardiovascular:  regular rate and rhythm


Gastrointestinal:  nl liver, spleen, soft


Musculoskeletal:  nl extremities to inspection





Results


Result Diagram:  


5/24/17 0815                                                                   

             5/24/17 0815





Results 24 hrs





Laboratory Tests








Test


  5/24/17


08:15


 


White Blood Count 14.4  H


 


Red Blood Count 3.41  L


 


Hemoglobin 9.5  L


 


Hematocrit 29.7  L


 


Mean Corpuscular Volume 87.1  


 


Mean Corpuscular Hemoglobin 27.9  L


 


Mean Corpuscular Hemoglobin


Concent 32.0  


 


 


Red Cell Distribution Width 15.2  H


 


Platelet Count 320  


 


Mean Platelet Volume 10.0  


 


Neutrophils % 69.7  


 


Lymphocytes % 16.4  


 


Monocytes % 8.4  


 


Eosinophils % 2.3  


 


Basophils % 0.3  


 


Nucleated Red Blood Cells % 0.1  H


 


Neutrophils # 10.0  H


 


Lymphocytes # 2.4  


 


Monocytes # 1.2  H


 


Eosinophils # 0.3  


 


Basophils # 0.0  


 


Nucleated Red Blood Cells # 0.0  


 


Sodium Level 134  L


 


Potassium Level 4.6  


 


Chloride Level 106  


 


Carbon Dioxide Level 20  L


 


Anion Gap 13  


 


Blood Urea Nitrogen 13  


 


Creatinine 1.20  H


 


Glucose Level 113  


 


Calcium Level 8.4  


 


Phosphorus Level 3.5  


 


Magnesium Level 1.9  


 


Total Bilirubin 0.0  L


 


Direct Bilirubin 0.00  


 


Indirect Bilirubin 0.0  


 


Aspartate Amino Transf


(AST/SGOT) 28  


 


 


Alanine Aminotransferase


(ALT/SGPT) 35  


 


 


Alkaline Phosphatase 160  H


 


Total Protein 6.3  


 


Albumin 2.8  L


 


Globulin 3.50  H


 


Albumin/Globulin Ratio 0.80  











Medications


Medications





 Current Medications


Ciprofloxacin/ Dextrose 200 ml @  200 mls/hr Q24H IVPB  Last administered on 5/ 23/17at 17:39; Admin Dose 200 MLS/HR;  Start 5/17/17 at 18:30


Metronidazole (Flagyl 500 Mg (Pmx)) 100 ml @  100 mls/hr Q8 IVPB  Last 

administered on 5/24/17at 15:00; Admin Dose 100 MLS/HR;  Start 5/17/17 at 22:00


Morphine Sulfate (morphine) 2 mg Q4H  PRN IV PAIN Last administered on 5/23/ 17at 18:41; Admin Dose 2 MG;  Start 5/17/17 at 18:30


Famotidine (Pepcid) 20 mg Q24H PO  Last administered on 5/23/17at 20:51; Admin 

Dose 20 MG;  Start 5/17/17 at 21:00


Docusate Sodium (Colace) 100 mg BID PO  Last administered on 5/24/17at 08:57; 

Admin Dose 100 MG;  Start 5/17/17 at 21:00


Miscellaneous Information Patients own medicat... BID@10,16 XX ;  Start 5/18/17 

at 10:00


Sodium Chloride (NS) 1,000 ml @  75 mls/hr [id number] IV  Last administered on 5/21 /17at 17:45; Admin Dose 75 MLS/HR;  Start 5/19/17 at 09:30


Salmeterol Xinafoate/ Fluticasone (Advair 250/50 Diskus) 1 inh BID INH  Last 

administered on 5/24/17at 09:17; Admin Dose 1 INH;  Start 5/19/17 at 13:00


Sumatriptan Succinate (Imitrex) 50 mg DAILY  PRN PO FOR MIGRAINE.MRX1 IN 24HOUR 

Last administered on 5/21/17at 10:49; Admin Dose 50 MG;  Start 5/21/17 at 01:00


Topiramate (Topamax) 50 mg BID PO  Last administered on 5/24/17at 08:57; Admin 

Dose 50 MG;  Start 5/21/17 at 21:00


Acetaminophen/ Hydrocodone Bitart (Norco (5/325)) 1 tab Q4H  PRN PO PAIN LEVEL 4

-7;  Start 5/22/17 at 10:30


Acetaminophen/ Hydrocodone Bitart (Norco (5/325)) 2 tab Q4H  PRN PO PAIN LEVEL 7

-10;  Start 5/22/17 at 10:30


Hydromorphone HCl (Dilaudid) 0.5 mg Q2H  PRN IV PAIN Last administered on 5/24/ 17at 11:48; Admin Dose 0.5 MG;  Start 5/22/17 at 10:30


Hydromorphone HCl (Dilaudid) 1 mg Q2  PRN IV PAIN Last administered on 5/24/ 17at 15:00; Admin Dose 1 MG;  Start 5/22/17 at 10:30


Docusate Sodium (Colace) 100 mg BID  PRN PO CONSTIPATION;  Start 5/22/17 at 10:

30


Bisacodyl (Dulcolax Supp) 10 mg BID  PRN AR CONSTIPATION;  Start 5/22/17 at 10:

30


Sodium Biphosphate/ Sodium Phosphate (Fleet Enema) 133 ml BID  PRN AR 

CONSTIPATION;  Start 5/22/17 at 10:30











VICK SNELL MD May 24, 2017 16:59

## 2017-05-24 NOTE — PN
DATE:  05/23/2017

 

 

SUBJECTIVE:  The patient is stable.  The patient yesterday had a laparoscopic cholecystectomy, davi
ated it well without any complications.  No other events noted.

 

OBJECTIVE:

VITAL SIGNS:  Blood pressure is 151, respirations 20, pulse 71, temperature 98.2.

HEENT:  Head is normocephalic.

NECK:  Supple.

HEART:  Regular rate.  

LUNGS:  Show diminished breath sounds at the base.

ABDOMEN:  Soft, positive tenderness to palpation, positive drain noted.

DERMATOLOGIC:  No rashes.

MUSCULOSKELETAL:  No joint effusions.

NEUROLOGIC:  No focal deficits.

EXTREMITIES:  Negative for clubbing, cyanosis, or edema.

 

MEDICATIONS:  The patient's medications have been reviewed.

 

LABORATORY DATA:  Currently pending.

 

ASSESSMENT AND PLAN:

1.  Nonoliguric acute kidney injury on top of chronic kidney disease with a unilateral functioning k
idney.  Etiology of acute kidney injury is secondary to hemodynamics.  Renal functions improved.  At
 this point, continue current treatment plan, supportive care, renally dose all meds, avoid nephroto
xins.

2.  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  The patient was born with atrophic r
ight kidney.  Renal function has improved.  Continue treating acute kidney injury as stated above.  
Continue current treatment plan.

3.  Mineral bone disorder.  Continue to monitor calcium and phosphorus levels.

4.  Hypomagnesemia.  Continue to monitor.

5.  Anemia.  Continue to monitor hemoglobin and hematocrit levels.

6.  Acute cholecystitis.  The patient is status post laparoscopic cholecystectomy postop day #1.  Co
ntinue to monitor, continue IV fluids.

7.  Gastrointestinal and deep venous thrombosis prophylaxis.  Continue PPI and sequential leg squeez
ers.

 

 

Dictated By: EZRA CAMPUZANO/JOSE ANGEL

DD:    05/23/2017 08:36:35

DT:    05/23/2017 09:22:27

Conf#: [id number]

DID#:  [id number]

## 2017-05-25 NOTE — PN
DATE:  05/25/2017

 

 

SUBJECTIVE:  The patient is stable.  The patient's pain is present, but controlled.  No other events
 noted.

 

OBJECTIVE:

VITAL SIGNS:  Blood pressure 105/47, respirations 18, pulse 65, temperature 97.8.

HEENT:  Head is normocephalic.

NECK:  Supple.

HEART:  Regular rate.

LUNGS:  Show diminished breath sounds at the base.

ABDOMEN:  Soft, nontender to palpation.  No rebound or guarding.

EXTREMITIES:  Negative for clubbing, cyanosis, no edema.

DERMATOLOGIC:  No rashes.

MUSCULOSKELETAL:  No joint effusions.

NEUROLOGIC:  No change in exam.

 

MEDICATIONS:  The patient's medications have been reviewed.

 

LABORATORY DATA:  Shows sodium 137, potassium 5.7, BUN 14, creatinine 1.34, white count 14.4, hemogl
obin 9.5, hematocrit 29.7, platelet count is 320.

 

ASSESSMENT AND PLAN:

1.  Nonoliguric acute kidney injury on top of chronic kidney disease with unilateral functioning kid
[place].  Etiology of acute kidney injury is secondary to hemodynamics.  Renal function has significantl
y improved and stabilized.  At this point, continue current treatment plan, supportive care, renally
 dose all medications.

2.  Chronic kidney disease with unilateral function.  Renal function is stabilized.  Continue diseas
e factor modification.  Continue good glycemic and blood pressure control.

3.  Hypernatremia, resolved.

4.  Anemia.  Continue to monitor hemoglobin and hematocrit levels.

5.  Acute cholecystitis.  The patient is status post laparoscopic cholecystectomy.  Continue current
 medical management.  Continue pain control.

6.  Anemia.  Continue to monitor hemoglobin and hematocrit levels.

7.  Metabolic disorder.  Continue to monitor calcium and phosphorus levels.

8.  Gastrointestinal and deep venous thrombosis prophylaxis.

 

 

Dictated By: EZRA CAMPUZANO/JOSE ANGEL

DD:    05/25/2017 08:38:21

DT:    05/25/2017 09:42:32

Conf#: [id number]

DID#:  [id number]

## 2017-05-25 NOTE — DS
Date/Time of Note


Date/Time of Note


DATE: 5/25/17 


TIME: 14:16





Discharge Summary


Admission/Discharge Info


Admit Date/Time


May 17, 2017 at 18:05


Discharge Date/Time


May 25, 2017 at 12:05


Final Diagnosis


See dictated discharge summary from May 24, 2017.


Patient Condition:  Stable


Hx of Present Illness


PRESENTING COMPLAINT: abd pain


HISTORY OF PRESENTING COMPLAINT: This is a pleasant 62-year-old female who 

presents to the ER today with abdominal pain.  Pain is located in the 

epigastric region that has been going on for the last 2 days.  Pain is 

associated with nausea and vomiting, and described as an aching and sharp pain.

  Patient does have a history of gallstones and she might have a history of 

renal problems.  Pain was relieved by medications given in the emergency room.  

Patient was worked up extensively by ER doctor, and findings are highly 

suggestive of cholelithiasis with choledocholithiasis.  She is being admitted 

for further workup and management as well as gastroenterology as well as 

surgical review.


Hospital Course


The patient was admitted, and as mentioned above consult to obtain with surgery

, GI, nephrology.  The patient was supported with anti-emetics and IV pain 

control. She underwent an ERCP May 19, 2017 that showed a small gastric 

ulceration that looked benign on endoscopy, a markedly dilated common bile duct 

without definitive stones or intraductal pathology suggestive of ampullary 

stricture and is a 7 cm stent was placed in the bile duct with excellent 

drainage.  There was a slight dilatation in the head of the pancreas.  Based on 

this findings, brushes were sent for pathology and tumor markers were also 

obtained.  For the tumor markers CA-19-9 came back normal as did the 

carcinoembryonic antigen.  


She was taken to the operative room by the surgeon May 22, 2017 and underwent a 

laparoscopic cholecystectomy.  





She was found to have gangrene and perforation and a drain was left in place 

which is to be removed today prior to discharge.  





Pathology findings on the distal common bile duct proved to be insufficient for 

evaluation around the gallbladder showed just an acute gangrenous and chronic 

cholecystitis without evidence of malignancy.  





Her renal function improved nicely while in the hospital in a state stable and 

her electrolytes have all been monitored and repleted as indicated.  She 

continues to do well and was currently awaiting a surgical clearance for 

discharge at this time.





The patient was also counseled on the need to quit tobacco use and this was 

reinforced daily while in-house.





Comorbidities were also aggressively managed as per Med records. 


Patient at this time has been evaluated and examined in detail and is assessed 

to be in stable condition and ready for discharge.





Addendum:


Patient's discharge was held yesterday as she was not cleared by surgery, but 

she has been cleared today and remains stable for discharge in stable condition


Home Meds


Active Scripts


Lactobacillus Acidoph/Bulgaricus* (Floranex*) 1 Each Tablet, 1 TAB.CHEW PO BID 

for 7 Days, TAB.CHEW


   Prov:MARIA DEL ROSARIO WISDOM M.         5/24/17


Metronidazole (Flagyl) 500 Mg Tab, 500 MG PO Q8 for 7 Days, TAB


   Prov:NINA WISDOMO M.         5/24/17


Ciprofloxacin Hcl* (Ciprofloxacin Hcl*) 500 Mg Tablet, 500 MG PO BID, #14 TAB


   Prov:MARIA DEL ROSARIO WISDOM M.         5/24/17


Docusate Sodium* (Colace*) 250 Mg Capsule, 250 MG PO DAILY, #30 CAP


   Prov:MARIA DEL ROSARIO WISDOM.         5/24/17


Hydrocodone/Acetaminophen (Norco 7.5-325 Tablet) 1 Each Tablet, 1 EACH PO Q6H, #

60 TAB


   Prov:MARIA DEL ROSARIO WISDOM.         5/24/17


Salmeterol Xinaf/Fluticasone* (Advair*) 250-50 Diskus Inhaler, 1 INH INH BID, #

1 VIAL 2 Refills


   Prov:MARIA DEL ROSARIO WISDOM.         5/24/17


Reported Medications


Promethazine Hcl* (Phenergan*) 25 Mg Tablet, 25 MG PO Q6H Y for NAUSEA AND/OR 

VOMITING, TAB


   5/17/17


Omeprazole* (Omeprazole*) 20 Mg Capsule.dr, 20 MG PO DAILY, #30 CAP


   5/17/17


Simvastatin* (Zocor*) 40 Mg Tablet, 40 MG PO QHS, #30 TAB


   5/17/17


Follow-up Plan


See dictated discharge summary from May 24, 2017.


Primary Care Provider


Tae Lai


Time spent on discharge:  < 30 minutes


Pending Labs





Laboratory Tests








Test


  5/25/17


05:11


 


Sodium Level


  137mmol/L


(135-144)


 


Potassium Level


  4.7mmol/L


(3.5-5.1)


 


Chloride Level


  106mmol/L


()


 


Carbon Dioxide Level


  21mmol/L


(21-31)


 


Anion Gap 15 (8-16) 


 


Blood Urea Nitrogen 14mg/dl (7-20) 


 


Creatinine


  1.34mg/dl


(0.44-1.00)


 


Glucose Level


  90mg/dl


()


 


Calcium Level


  8.6mg/dl


(8.4-10.2)


 


Phosphorus Level


  4.9mg/dl


(2.5-4.9)


 


Magnesium Level


  1.9mg/dl


(1.7-2.5)

















MARIA DEL ROSARIO WISDOM May 25, 2017 14:18

## 2017-06-21 NOTE — PN
Date/Time of Note


Date/Time of Note


DATE: 6/21/17 


TIME: 11:33





Assessment/Plan





[facility] Progress Note





Date of Service: 6/21/17





Today's Impression & Plan:





Overall stable and doing remarkably well given her perforated and gangrenous 

cholecystitis almost a month ago. I do not sense any major actionable intra-

abdominal or wound issues, but I did offer the option of doing a CT scan of abd/

pelvis to further investigate. After extensive discussion and review of 

rational behind my thoughts, the patient and her friend felt comfortable enough 

to simply follow her symptoms for now and keep in touch with her primary 

physician (Dr. Lai). Answered all questions.





With above assessment, I've recommended the following for today:





1. F/u with Dr. Lai


2. F/u with us prn


3. Advised against working as hard as she has been in the last 2 weeks


4. Consider abd/pelvis CT with IV and oral contrast if her symptoms continue or 

worsen





Thank you again for your great care of this very pleasant patient and wonderful 

family. If there are any questions, please feel free to call me at [phone].


 


TOTAL VISIT TIME: 20 minutes of which more than half was spent in the operating 

room, as well as coordination of care between multiple physicians and providers.





Disclaimer: Inadvertent spelling or grammatical errors are likely due to EHR/

dictation software use and do not reflect on the overall quality of patient 

care.





Updated Clinical Summary:





A very pleasant 62-year-old lady admitted through the [facility] on 05/17/2017 with evidence for possible acute 

cholecystitis as well as choledocholithiasis. S/p difficult but uncomplicated 

lap pa 5/22/17. Drain d/c 5/24/17. D/c May 25, 2017.


 


COMORBIDITIES:


1.  BMI 26.9.


2.  Smoking history.


3.  Renal insufficiency.


4.  History of known cholelithiasis.


5.  Status post appendectomy.


6.  Status post hysterectomy.


7.  S/p difficult but uncomplicated lap pa 5/22/17 for perforated gangrenous 

acute on chronic cholecystitis.





Subjective: 





No major events or complaints since discharge; many vague and minor complaints 

including discomfort at the sub-xiphoid incision, back pain, neck pain and 

headaches; no major central abd pain; seems under control with medications; no 

major reported issues with n/v/d; no sob or cp; + flatus; + BM; significant 

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





Objective:





Vitals: See below





Exam: 





GENERAL: On exam, the patient was sitting in a chair and appeared to be 

comfortable and in no acute distress.





ABDOMEN: Soft, minimally tender to palpation around the sub-xiphoid incision, 

but not the rest, and nondistended. Incisions c/d/i w/o obvious underlying e/e/d

/h. There are no peritoneal signs or guarding. 





SKIN: Skin appears to be pink and feels warm to touch.





NEUROLOGIC: Patient is awake, alert, and follows commands appropriately. Does 

not appear confused





Exam/Review of Systems


Vital Signs


Vitals





 Vital Signs








  Date Time  Temp Pulse Resp B/P Pulse Ox O2 Delivery O2 Flow Rate FiO2


 


6/21/17 11:17 97.6 68 18 146/78 98 Room Air  

















HASEEB ROBLES M.D. Jun 21, 2017 11:45

## 2019-03-14 ENCOUNTER — HOSPITAL ENCOUNTER (EMERGENCY)
Dept: HOSPITAL 91 - E/R | Age: 64
Discharge: HOME | End: 2019-03-14
Payer: MEDICARE

## 2019-03-14 ENCOUNTER — HOSPITAL ENCOUNTER (EMERGENCY)
Dept: HOSPITAL 10 - E/R | Age: 64
Discharge: HOME | End: 2019-03-14
Payer: MEDICARE

## 2019-03-14 VITALS — DIASTOLIC BLOOD PRESSURE: 65 MMHG | HEART RATE: 80 BPM | RESPIRATION RATE: 20 BRPM | SYSTOLIC BLOOD PRESSURE: 102 MMHG

## 2019-03-14 VITALS
BODY MASS INDEX: 22.62 KG/M2 | BODY MASS INDEX: 22.62 KG/M2 | WEIGHT: 115.24 LBS | HEIGHT: 60 IN | HEIGHT: 60 IN | WEIGHT: 115.24 LBS

## 2019-03-14 DIAGNOSIS — J40: Primary | ICD-10-CM

## 2019-03-14 DIAGNOSIS — I10: ICD-10-CM

## 2019-03-14 PROCEDURE — 93005 ELECTROCARDIOGRAM TRACING: CPT

## 2019-03-14 PROCEDURE — 71045 X-RAY EXAM CHEST 1 VIEW: CPT

## 2019-03-14 PROCEDURE — 99284 EMERGENCY DEPT VISIT MOD MDM: CPT

## 2019-03-14 RX ADMIN — AZITHROMYCIN 1 MG: 250 TABLET, FILM COATED ORAL at 13:43

## 2019-03-14 RX ADMIN — BENZONATATE 1 MG: 100 CAPSULE ORAL at 13:43

## 2019-03-14 NOTE — ERD
ER Documentation


Chief Complaint


Chief Complaint





CHEST PAIN; FEVER; HEADACHE X  3 DAYS





HPI


Patient is a 64-year-old female with hypertension who presents saying "I 


probably have pneumonia".  The patient's of the 2 weeks ago she started with a 


cough with phlegm.  She feels shortness of breath and feels "stuffed up".  She 


reports subjective fevers.  She tried Tylenol cold and flu.  Upon review of old 


medical records the patient one previous visit to the ER in 2017.  She says that


her primary doctor is at [facility].





ROS


All systems reviewed and are negative except as per history of present illness.





Medications


Home Meds


Active Scripts


Benzonatate* (Tessalon Perle*) 100 Mg Capsule, 100 MG PO Q8H PRN for COUGH for 7


Days, CAP


   Prov:ENMANUEL BARRON MD         3/14/19


Azithromycin* (Zithromax*) 250 Mg Tablet, 250 MG PO DAILY for 4 Days, TAB


   Prov:ENMANUEL BARRON MD         3/14/19


Docusate Sodium* (Colace*) 250 Mg Capsule, 250 MG PO DAILY, #30 CAP


   Prov:MARIA DEL ROSARIO WISDOM.         5/24/17


Hydrocodone/Acetaminophen (Norco 7.5-325 Tablet) 1 Each Tablet, 1 EACH PO Q6H, 


#60 TAB


   Prov:MARIA DEL ROSARIO WISDOM.         5/24/17


Salmeterol Xinaf/Fluticasone* (Advair*) 250-50 Diskus Inhaler, 1 INH INH BID, #1


VIAL 2 Refills


   Prov:MARIA DEL ROSARIO WISDOM         5/24/17


Reported Medications


Omeprazole* (Omeprazole*) 20 Mg Capsule.dr, 20 MG PO DAILY, #30 CAP


   5/17/17


Simvastatin* (Zocor*) 40 Mg Tablet, 40 MG PO QHS, #30 TAB


   5/17/17





Allergies


Allergies:  


Coded Allergies:  


     Penicillins (Verified  Allergy, Unknown, 5/17/17)


     diphenhydramine (Verified  Allergy, Unknown, 5/17/17)


     tamsulosin (Verified  Allergy, Unknown, 5/17/17)





PMhx/Soc


History of Surgery:  Yes (appendectomy,hysterectomy,)


Anesthesia Reaction:  No


Hx Respiratory Disorders:  Yes (PNA)


Hx Cardiac Disorders:  No


Hx Psychiatric Problems:  No


Hx Miscellaneous Medical Probl:  Yes (acute renal failure, leukocytosis,tobacco 


abuse,gallstone,appendectomy)


Hx Alcohol Use:  Yes


Hx Substance Use:  No


Hx Tobacco Use:  Yes


Smoking Status:  Former smoker





FmHx


Family History:  diabetes





Physical Exam


Vitals





Vital Signs


  Date      Temp  Pulse  Resp  B/P (MAP)   Pulse Ox  O2          O2 Flow    FiO2


Time                                                 Delivery    Rate


   3/14/19  97.5     80    20      102/65        98  Room Air


     14:42                           (77)


   3/14/19  97.7     99    24      140/90        98


     11:58                          (107)





Physical Exam


Const:   No acute distress


Head:   Atraumatic 


Eyes:    Normal Conjunctiva


ENT:    Normal External Ears, Nose and Mouth.


Neck:               Full range of motion. No meningismus.


Resp:   Clear to auscultation bilaterally


Cardio:   Regular rate and rhythm, no murmurs


Abd:    Soft, non tender, non distended. Normal bowel sounds


Skin:   No petechiae or rashes


Back:   No midline or flank tenderness


Ext:    No cyanosis, or edema


Neur:   Awake and alert


Psych:    Normal Mood and Affect


Results 24 hrs





Current Medications


 Medications
   Dose
          Sig/Halile
       Start Time
   Status  Last


 (Trade)       Ordered        Route
 PRN     Stop Time              Admin
Dose


                              Reason                                Admin


                500 mg         ONCE  ONCE
    3/14/19       DC           3/14/19


Azithromycin
                 PO
            13:00
                       13:43



 (Zithromax)                                 3/14/19 13:01


 Benzonatate
   200 mg         ONCE  ONCE
    3/14/19       DC           3/14/19


 (Tessalon)                   PO
            13:00
                       13:43



                                             3/14/19 13:01








Procedures/MDM


EKG read by me: 


Rate/Rhythm: Regular rate and rhythm at a rate of 91


Intervals:    Normal


Impression:     No evidence of ischemia or arrhythmia





Chest x-ray negative per radiology.





Patient is a 64-year-old female who presents with cough with productive phlegm. 


The patient has no sign of pneumonia or pneumothorax on chest x-ray.  I doubt 


pulmonary embolism or aortic dissection or acute coronary syndrome.  The patient


will be treated with Zithromax for 5-day course and the first dose was given in 


the emergency department.  She also be given Zithromax.  She can return for any 


worsening symptoms.





Departure


Diagnosis:  


   Primary Impression:  


   Bronchitis


Condition:  Fair


Patient Instructions:  Bronchitis, Antiobiotic Treatment (Adult)


Referrals:  


Your doctor at [facility]





Additional Instructions:  


Call your primary care doctor TOMORROW for an appointment during the next 1-2 


days.See the doctor sooner or return here if your condition worsens before your 


appointment time.











ENMANUEL BARRON MD                Mar 14, 2019 13:55

## 2021-03-03 DIAGNOSIS — Z23 NEED FOR VACCINATION: ICD-10-CM
